# Patient Record
Sex: MALE | Race: WHITE | Employment: FULL TIME
[De-identification: names, ages, dates, MRNs, and addresses within clinical notes are randomized per-mention and may not be internally consistent; named-entity substitution may affect disease eponyms.]

---

## 2017-01-06 ENCOUNTER — OFFICE VISIT (OUTPATIENT)
Dept: UROLOGY | Facility: CLINIC | Age: 32
End: 2017-01-06

## 2017-01-06 VITALS
TEMPERATURE: 98.4 F | WEIGHT: 228.8 LBS | HEART RATE: 113 BPM | SYSTOLIC BLOOD PRESSURE: 125 MMHG | BODY MASS INDEX: 34.68 KG/M2 | HEIGHT: 68 IN | DIASTOLIC BLOOD PRESSURE: 81 MMHG

## 2017-01-06 DIAGNOSIS — N50.819 ORCHALGIA: Primary | ICD-10-CM

## 2017-01-06 PROCEDURE — 99204 OFFICE O/P NEW MOD 45 MIN: CPT | Performed by: UROLOGY

## 2017-01-06 PROCEDURE — 51798 US URINE CAPACITY MEASURE: CPT | Performed by: UROLOGY

## 2017-01-06 RX ORDER — DOXYCYCLINE 100 MG/1
100 CAPSULE ORAL 2 TIMES DAILY
Qty: 14 CAPSULE | Refills: 0 | Status: SHIPPED | OUTPATIENT
Start: 2017-01-06 | End: 2017-05-19

## 2017-01-06 ASSESSMENT — ENCOUNTER SYMPTOMS
EYE REDNESS: 0
SHORTNESS OF BREATH: 0
VOMITING: 0
WHEEZING: 0
NAUSEA: 0
BACK PAIN: 0
COUGH: 1
EYE PAIN: 0
ABDOMINAL PAIN: 0
COLOR CHANGE: 0

## 2017-05-19 ENCOUNTER — OFFICE VISIT (OUTPATIENT)
Dept: FAMILY MEDICINE CLINIC | Age: 32
End: 2017-05-19
Payer: COMMERCIAL

## 2017-05-19 VITALS
WEIGHT: 229 LBS | RESPIRATION RATE: 17 BRPM | HEART RATE: 80 BPM | DIASTOLIC BLOOD PRESSURE: 86 MMHG | SYSTOLIC BLOOD PRESSURE: 120 MMHG | BODY MASS INDEX: 34.82 KG/M2

## 2017-05-19 DIAGNOSIS — J30.2 SEASONAL ALLERGIC RHINITIS, UNSPECIFIED ALLERGIC RHINITIS TRIGGER: ICD-10-CM

## 2017-05-19 DIAGNOSIS — M10.00 IDIOPATHIC GOUT, UNSPECIFIED CHRONICITY, UNSPECIFIED SITE: Primary | ICD-10-CM

## 2017-05-19 DIAGNOSIS — E66.9 OBESITY (BMI 30.0-34.9): ICD-10-CM

## 2017-05-19 DIAGNOSIS — E78.1 HYPERTRIGLYCERIDEMIA: ICD-10-CM

## 2017-05-19 DIAGNOSIS — R73.01 IMPAIRED FASTING GLUCOSE: ICD-10-CM

## 2017-05-19 PROCEDURE — 1036F TOBACCO NON-USER: CPT | Performed by: FAMILY MEDICINE

## 2017-05-19 PROCEDURE — G8417 CALC BMI ABV UP PARAM F/U: HCPCS | Performed by: FAMILY MEDICINE

## 2017-05-19 PROCEDURE — 99214 OFFICE O/P EST MOD 30 MIN: CPT | Performed by: FAMILY MEDICINE

## 2017-05-19 PROCEDURE — G8427 DOCREV CUR MEDS BY ELIG CLIN: HCPCS | Performed by: FAMILY MEDICINE

## 2017-05-19 RX ORDER — ALBUTEROL SULFATE 90 UG/1
2 AEROSOL, METERED RESPIRATORY (INHALATION) EVERY 6 HOURS PRN
Qty: 1 INHALER | Refills: 0 | Status: SHIPPED | OUTPATIENT
Start: 2017-05-19 | End: 2019-03-14 | Stop reason: SDUPTHER

## 2017-05-19 RX ORDER — METHYLPREDNISOLONE 4 MG/1
TABLET ORAL
Qty: 1 KIT | Refills: 0 | Status: SHIPPED | OUTPATIENT
Start: 2017-05-19 | End: 2018-11-23 | Stop reason: SDUPTHER

## 2017-05-19 RX ORDER — FLUTICASONE PROPIONATE 50 MCG
SPRAY, SUSPENSION (ML) NASAL
Qty: 1 BOTTLE | Refills: 3 | Status: SHIPPED | OUTPATIENT
Start: 2017-05-19 | End: 2020-09-30 | Stop reason: ALTCHOICE

## 2017-05-19 RX ORDER — LORATADINE 10 MG/1
10 TABLET ORAL DAILY
Qty: 30 TABLET | Refills: 3 | Status: SHIPPED | OUTPATIENT
Start: 2017-05-19 | End: 2018-11-23 | Stop reason: SDUPTHER

## 2017-05-19 ASSESSMENT — PATIENT HEALTH QUESTIONNAIRE - PHQ9
SUM OF ALL RESPONSES TO PHQ QUESTIONS 1-9: 0
SUM OF ALL RESPONSES TO PHQ9 QUESTIONS 1 & 2: 0
2. FEELING DOWN, DEPRESSED OR HOPELESS: 0
1. LITTLE INTEREST OR PLEASURE IN DOING THINGS: 0

## 2017-05-19 ASSESSMENT — ENCOUNTER SYMPTOMS
BLOOD IN STOOL: 0
RHINORRHEA: 1
SHORTNESS OF BREATH: 1
WHEEZING: 1
ABDOMINAL PAIN: 0
EYE ITCHING: 1

## 2018-11-16 ENCOUNTER — TELEPHONE (OUTPATIENT)
Dept: FAMILY MEDICINE CLINIC | Age: 33
End: 2018-11-16

## 2018-11-16 RX ORDER — INDOMETHACIN 25 MG/1
CAPSULE ORAL
Qty: 30 CAPSULE | Refills: 0 | Status: SHIPPED | OUTPATIENT
Start: 2018-11-16 | End: 2019-03-14 | Stop reason: SDUPTHER

## 2018-11-23 ENCOUNTER — HOSPITAL ENCOUNTER (OUTPATIENT)
Age: 33
Discharge: HOME | End: 2018-11-25
Payer: COMMERCIAL

## 2018-11-23 ENCOUNTER — OFFICE VISIT (OUTPATIENT)
Dept: FAMILY MEDICINE CLINIC | Age: 33
End: 2018-11-23
Payer: COMMERCIAL

## 2018-11-23 ENCOUNTER — HOSPITAL ENCOUNTER (OUTPATIENT)
Age: 33
Discharge: HOME | End: 2018-11-23
Payer: COMMERCIAL

## 2018-11-23 ENCOUNTER — HOSPITAL ENCOUNTER (OUTPATIENT)
Dept: GENERAL RADIOLOGY | Age: 33
Discharge: HOME | End: 2018-11-25
Payer: COMMERCIAL

## 2018-11-23 VITALS
WEIGHT: 225.4 LBS | DIASTOLIC BLOOD PRESSURE: 80 MMHG | BODY MASS INDEX: 34.27 KG/M2 | HEART RATE: 60 BPM | RESPIRATION RATE: 16 BRPM | SYSTOLIC BLOOD PRESSURE: 120 MMHG

## 2018-11-23 DIAGNOSIS — M10.00 IDIOPATHIC GOUT, UNSPECIFIED CHRONICITY, UNSPECIFIED SITE: ICD-10-CM

## 2018-11-23 DIAGNOSIS — R04.2 HEMOPTYSIS: ICD-10-CM

## 2018-11-23 DIAGNOSIS — E78.1 HYPERTRIGLYCERIDEMIA: ICD-10-CM

## 2018-11-23 DIAGNOSIS — R04.2 HEMOPTYSIS: Primary | ICD-10-CM

## 2018-11-23 LAB
ABSOLUTE EOS #: 0.1 K/UL (ref 0–0.4)
ABSOLUTE IMMATURE GRANULOCYTE: ABNORMAL K/UL (ref 0–0.3)
ABSOLUTE LYMPH #: 1.9 K/UL (ref 1–4.8)
ABSOLUTE MONO #: 0.5 K/UL (ref 0.1–1.3)
ALBUMIN SERPL-MCNC: 4.4 G/DL (ref 3.5–5.2)
ALBUMIN/GLOBULIN RATIO: ABNORMAL (ref 1–2.5)
ALP BLD-CCNC: 42 U/L (ref 40–129)
ALT SERPL-CCNC: 34 U/L (ref 5–41)
ANION GAP SERPL CALCULATED.3IONS-SCNC: 10 MMOL/L (ref 9–17)
AST SERPL-CCNC: 24 U/L
BASOPHILS # BLD: 1 % (ref 0–2)
BASOPHILS ABSOLUTE: 0.1 K/UL (ref 0–0.2)
BILIRUB SERPL-MCNC: 0.36 MG/DL (ref 0.3–1.2)
BUN BLDV-MCNC: 14 MG/DL (ref 6–20)
BUN/CREAT BLD: ABNORMAL (ref 9–20)
CALCIUM SERPL-MCNC: 9.6 MG/DL (ref 8.6–10.4)
CHLORIDE BLD-SCNC: 102 MMOL/L (ref 98–107)
CHOLESTEROL/HDL RATIO: 3.4
CHOLESTEROL: 153 MG/DL
CO2: 28 MMOL/L (ref 20–31)
CREAT SERPL-MCNC: 0.84 MG/DL (ref 0.7–1.2)
DIFFERENTIAL TYPE: ABNORMAL
EOSINOPHILS RELATIVE PERCENT: 1 % (ref 0–4)
GFR AFRICAN AMERICAN: >60 ML/MIN
GFR NON-AFRICAN AMERICAN: >60 ML/MIN
GFR SERPL CREATININE-BSD FRML MDRD: ABNORMAL ML/MIN/{1.73_M2}
GFR SERPL CREATININE-BSD FRML MDRD: ABNORMAL ML/MIN/{1.73_M2}
GLUCOSE BLD-MCNC: 105 MG/DL (ref 70–99)
HCT VFR BLD CALC: 46.2 % (ref 41–53)
HDLC SERPL-MCNC: 45 MG/DL
HEMOGLOBIN: 15.6 G/DL (ref 13.5–17.5)
IMMATURE GRANULOCYTES: ABNORMAL %
LDL CHOLESTEROL: 79 MG/DL (ref 0–130)
LYMPHOCYTES # BLD: 31 % (ref 24–44)
MCH RBC QN AUTO: 30.8 PG (ref 26–34)
MCHC RBC AUTO-ENTMCNC: 33.7 G/DL (ref 31–37)
MCV RBC AUTO: 91.4 FL (ref 80–100)
MONOCYTES # BLD: 8 % (ref 1–7)
NRBC AUTOMATED: ABNORMAL PER 100 WBC
PDW BLD-RTO: 12.6 % (ref 11.5–14.9)
PLATELET # BLD: 236 K/UL (ref 150–450)
PLATELET ESTIMATE: ABNORMAL
PMV BLD AUTO: 7.8 FL (ref 6–12)
POTASSIUM SERPL-SCNC: 4.6 MMOL/L (ref 3.7–5.3)
RBC # BLD: 5.06 M/UL (ref 4.5–5.9)
RBC # BLD: ABNORMAL 10*6/UL
SEG NEUTROPHILS: 59 % (ref 36–66)
SEGMENTED NEUTROPHILS ABSOLUTE COUNT: 3.7 K/UL (ref 1.3–9.1)
SODIUM BLD-SCNC: 140 MMOL/L (ref 135–144)
TOTAL PROTEIN: 7.4 G/DL (ref 6.4–8.3)
TRIGL SERPL-MCNC: 146 MG/DL
URIC ACID: 7.1 MG/DL (ref 3.4–7)
VLDLC SERPL CALC-MCNC: NORMAL MG/DL (ref 1–30)
WBC # BLD: 6.3 K/UL (ref 3.5–11)
WBC # BLD: ABNORMAL 10*3/UL

## 2018-11-23 PROCEDURE — 80053 COMPREHEN METABOLIC PANEL: CPT

## 2018-11-23 PROCEDURE — G8417 CALC BMI ABV UP PARAM F/U: HCPCS | Performed by: NURSE PRACTITIONER

## 2018-11-23 PROCEDURE — 71046 X-RAY EXAM CHEST 2 VIEWS: CPT

## 2018-11-23 PROCEDURE — 36415 COLL VENOUS BLD VENIPUNCTURE: CPT

## 2018-11-23 PROCEDURE — 84550 ASSAY OF BLOOD/URIC ACID: CPT

## 2018-11-23 PROCEDURE — 85025 COMPLETE CBC W/AUTO DIFF WBC: CPT

## 2018-11-23 PROCEDURE — G8427 DOCREV CUR MEDS BY ELIG CLIN: HCPCS | Performed by: NURSE PRACTITIONER

## 2018-11-23 PROCEDURE — 80061 LIPID PANEL: CPT

## 2018-11-23 PROCEDURE — 99215 OFFICE O/P EST HI 40 MIN: CPT | Performed by: NURSE PRACTITIONER

## 2018-11-23 PROCEDURE — 1036F TOBACCO NON-USER: CPT | Performed by: NURSE PRACTITIONER

## 2018-11-23 PROCEDURE — G8484 FLU IMMUNIZE NO ADMIN: HCPCS | Performed by: NURSE PRACTITIONER

## 2018-11-23 RX ORDER — LORATADINE 10 MG/1
10 TABLET ORAL DAILY
Qty: 30 TABLET | Refills: 3 | Status: SHIPPED | OUTPATIENT
Start: 2018-11-23

## 2018-11-23 RX ORDER — COLCHICINE 0.6 MG/1
0.6 TABLET ORAL DAILY
Qty: 30 TABLET | Refills: 0 | Status: SHIPPED | OUTPATIENT
Start: 2018-11-23 | End: 2019-03-28

## 2018-11-23 RX ORDER — METHYLPREDNISOLONE 4 MG/1
TABLET ORAL
Qty: 1 KIT | Refills: 0 | Status: SHIPPED | OUTPATIENT
Start: 2018-11-23 | End: 2019-03-14 | Stop reason: SDUPTHER

## 2018-11-23 RX ORDER — ALLOPURINOL 100 MG/1
100 TABLET ORAL DAILY
Qty: 30 TABLET | Refills: 5 | Status: SHIPPED | OUTPATIENT
Start: 2018-11-23 | End: 2019-03-14 | Stop reason: SDUPTHER

## 2018-11-23 RX ORDER — AZITHROMYCIN 250 MG/1
TABLET, FILM COATED ORAL
Qty: 1 PACKET | Refills: 0 | Status: SHIPPED | OUTPATIENT
Start: 2018-11-23 | End: 2018-12-03

## 2018-11-23 ASSESSMENT — PATIENT HEALTH QUESTIONNAIRE - PHQ9
SUM OF ALL RESPONSES TO PHQ QUESTIONS 1-9: 0
SUM OF ALL RESPONSES TO PHQ QUESTIONS 1-9: 0
1. LITTLE INTEREST OR PLEASURE IN DOING THINGS: 0
SUM OF ALL RESPONSES TO PHQ9 QUESTIONS 1 & 2: 0
2. FEELING DOWN, DEPRESSED OR HOPELESS: 0

## 2018-11-23 NOTE — PROGRESS NOTES
Subjective:      Patient ID: Evon Farmer is a 35 y.o. male. Visit Information    Have you changed or started any medications since your last visit including any over-the-counter medicines, vitamins, or herbal medicines? no   Are you having any side effects from any of your medications? -  no  Have you stopped taking any of your medications? Is so, why? -  no    Have you seen any other physician or provider since your last visit? No  Have you had any other diagnostic tests since your last visit? No  Have you been seen in the emergency room and/or had an admission to a hospital since we last saw you? No  Have you had your routine dental cleaning in the past 6 months? no    Have you activated your [employer] account? If not, what are your barriers? No     Patient Care Team:  Cirilo Diop MD as PCP - General (Family Medicine)    Medical History Review  Past Medical, Family, and Social History reviewed and does not contribute to the patient presenting condition    Health Maintenance   Topic Date Due    A1C test (Diabetic or Prediabetic)  02/25/1995    HIV screen  02/25/2000    DTaP/Tdap/Td vaccine (1 - Tdap) 02/25/2004    Flu vaccine (1) 09/01/2018     HPI  35year old male presents with hemoptysis nasal congestion/pressure post nasal drainage cough for 1 month. cough is productive. he denies fever chills body ache malaise or nv abd pain,  Pt is a non smoker. Also c/o left great toe pain swelling and takes indocin without relief. Used to take allopurinol but has been off for a while. Hx of gout  Also noted to have elevated triglycerides and has been on diet measures. Review of Systems   Constitutional: Negative for chills and fever. HENT: Positive for congestion, postnasal drip and rhinorrhea. Negative for sinus pressure. Eyes: Negative for visual disturbance. Respiratory: Positive for cough. Negative for shortness of breath.          Cough out blood tinged sputum   Cardiovascular: Negative for

## 2018-11-25 ASSESSMENT — ENCOUNTER SYMPTOMS
SHORTNESS OF BREATH: 0
SINUS PRESSURE: 0
VOMITING: 0
RHINORRHEA: 1
ABDOMINAL PAIN: 0
NAUSEA: 0
COUGH: 1

## 2019-03-14 ENCOUNTER — OFFICE VISIT (OUTPATIENT)
Dept: FAMILY MEDICINE CLINIC | Age: 34
End: 2019-03-14
Payer: COMMERCIAL

## 2019-03-14 VITALS
SYSTOLIC BLOOD PRESSURE: 120 MMHG | RESPIRATION RATE: 16 BRPM | WEIGHT: 231 LBS | TEMPERATURE: 97.5 F | BODY MASS INDEX: 35.01 KG/M2 | HEART RATE: 84 BPM | HEIGHT: 68 IN | DIASTOLIC BLOOD PRESSURE: 80 MMHG

## 2019-03-14 DIAGNOSIS — M10.00 IDIOPATHIC GOUT, UNSPECIFIED CHRONICITY, UNSPECIFIED SITE: Primary | ICD-10-CM

## 2019-03-14 DIAGNOSIS — J20.9 ACUTE BRONCHITIS, UNSPECIFIED ORGANISM: ICD-10-CM

## 2019-03-14 PROCEDURE — G8417 CALC BMI ABV UP PARAM F/U: HCPCS | Performed by: NURSE PRACTITIONER

## 2019-03-14 PROCEDURE — G8427 DOCREV CUR MEDS BY ELIG CLIN: HCPCS | Performed by: NURSE PRACTITIONER

## 2019-03-14 PROCEDURE — G8484 FLU IMMUNIZE NO ADMIN: HCPCS | Performed by: NURSE PRACTITIONER

## 2019-03-14 PROCEDURE — 99214 OFFICE O/P EST MOD 30 MIN: CPT | Performed by: NURSE PRACTITIONER

## 2019-03-14 PROCEDURE — 1036F TOBACCO NON-USER: CPT | Performed by: NURSE PRACTITIONER

## 2019-03-14 RX ORDER — INDOMETHACIN 25 MG/1
CAPSULE ORAL
Qty: 30 CAPSULE | Refills: 2 | Status: SHIPPED | OUTPATIENT
Start: 2019-03-14 | End: 2019-03-28

## 2019-03-14 RX ORDER — ALBUTEROL SULFATE 90 UG/1
2 AEROSOL, METERED RESPIRATORY (INHALATION) EVERY 6 HOURS PRN
Qty: 1 INHALER | Refills: 1 | Status: SHIPPED | OUTPATIENT
Start: 2019-03-14 | End: 2020-06-04

## 2019-03-14 RX ORDER — ALLOPURINOL 300 MG/1
300 TABLET ORAL DAILY
Qty: 30 TABLET | Refills: 5 | Status: SHIPPED | OUTPATIENT
Start: 2019-03-14 | End: 2020-06-04

## 2019-03-14 RX ORDER — METHYLPREDNISOLONE 4 MG/1
TABLET ORAL
Qty: 1 KIT | Refills: 0 | Status: SHIPPED | OUTPATIENT
Start: 2019-03-14 | End: 2019-09-13

## 2019-03-14 RX ORDER — BENZONATATE 200 MG/1
200 CAPSULE ORAL 3 TIMES DAILY PRN
Qty: 30 CAPSULE | Refills: 0 | Status: SHIPPED | OUTPATIENT
Start: 2019-03-14 | End: 2019-03-21

## 2019-03-14 ASSESSMENT — ENCOUNTER SYMPTOMS
SHORTNESS OF BREATH: 0
VOMITING: 0
SINUS PRESSURE: 1
NAUSEA: 0
RHINORRHEA: 1
COUGH: 1
ABDOMINAL PAIN: 0

## 2019-03-14 ASSESSMENT — PATIENT HEALTH QUESTIONNAIRE - PHQ9
SUM OF ALL RESPONSES TO PHQ QUESTIONS 1-9: 0
SUM OF ALL RESPONSES TO PHQ QUESTIONS 1-9: 0
2. FEELING DOWN, DEPRESSED OR HOPELESS: 0

## 2019-03-28 ENCOUNTER — TELEPHONE (OUTPATIENT)
Dept: FAMILY MEDICINE CLINIC | Age: 34
End: 2019-03-28

## 2019-03-28 RX ORDER — COLCHICINE 0.6 MG/1
1 CAPSULE ORAL DAILY
Qty: 10 CAPSULE | Refills: 0 | Status: SHIPPED | OUTPATIENT
Start: 2019-03-28 | End: 2019-04-26 | Stop reason: SDUPTHER

## 2019-03-28 RX ORDER — INDOMETHACIN 50 MG/1
50 CAPSULE ORAL 3 TIMES DAILY PRN
Qty: 30 CAPSULE | Refills: 0 | Status: SHIPPED | OUTPATIENT
Start: 2019-03-28 | End: 2019-04-26 | Stop reason: SDUPTHER

## 2019-04-26 ENCOUNTER — OFFICE VISIT (OUTPATIENT)
Dept: FAMILY MEDICINE CLINIC | Age: 34
End: 2019-04-26
Payer: COMMERCIAL

## 2019-04-26 VITALS
DIASTOLIC BLOOD PRESSURE: 76 MMHG | HEIGHT: 68 IN | HEART RATE: 84 BPM | WEIGHT: 233.2 LBS | RESPIRATION RATE: 16 BRPM | BODY MASS INDEX: 35.34 KG/M2 | SYSTOLIC BLOOD PRESSURE: 126 MMHG

## 2019-04-26 DIAGNOSIS — E79.0 ELEVATED URIC ACID IN BLOOD: ICD-10-CM

## 2019-04-26 DIAGNOSIS — M10.00 IDIOPATHIC GOUT, UNSPECIFIED CHRONICITY, UNSPECIFIED SITE: Primary | ICD-10-CM

## 2019-04-26 PROCEDURE — 1036F TOBACCO NON-USER: CPT | Performed by: NURSE PRACTITIONER

## 2019-04-26 PROCEDURE — G8417 CALC BMI ABV UP PARAM F/U: HCPCS | Performed by: NURSE PRACTITIONER

## 2019-04-26 PROCEDURE — 99214 OFFICE O/P EST MOD 30 MIN: CPT | Performed by: NURSE PRACTITIONER

## 2019-04-26 PROCEDURE — G8427 DOCREV CUR MEDS BY ELIG CLIN: HCPCS | Performed by: NURSE PRACTITIONER

## 2019-04-26 RX ORDER — INDOMETHACIN 50 MG/1
50 CAPSULE ORAL 3 TIMES DAILY PRN
Qty: 30 CAPSULE | Refills: 0 | Status: SHIPPED | OUTPATIENT
Start: 2019-04-26 | End: 2019-05-05 | Stop reason: SDUPTHER

## 2019-04-26 RX ORDER — COLCHICINE 0.6 MG/1
1 CAPSULE ORAL DAILY
Qty: 30 CAPSULE | Refills: 0 | Status: ON HOLD | OUTPATIENT
Start: 2019-04-26 | End: 2020-10-12

## 2019-04-26 ASSESSMENT — ENCOUNTER SYMPTOMS
COUGH: 0
SHORTNESS OF BREATH: 0
NAUSEA: 0
ABDOMINAL PAIN: 0

## 2019-04-26 ASSESSMENT — PATIENT HEALTH QUESTIONNAIRE - PHQ9
1. LITTLE INTEREST OR PLEASURE IN DOING THINGS: 0
SUM OF ALL RESPONSES TO PHQ QUESTIONS 1-9: 0
2. FEELING DOWN, DEPRESSED OR HOPELESS: 0
SUM OF ALL RESPONSES TO PHQ9 QUESTIONS 1 & 2: 0
SUM OF ALL RESPONSES TO PHQ QUESTIONS 1-9: 0

## 2019-04-26 NOTE — PATIENT INSTRUCTIONS
Patient Education        Purine-Restricted Diet: Care Instructions  Your Care Instructions    Purines are substances that are found in some foods. Your body turns purines into uric acid. High levels of uric acid can cause gout, which is a form of arthritis that causes pain and inflammation in joints. You may be able to help control the amount of uric acid in your body by limiting high-purine foods in your diet. Follow-up care is a key part of your treatment and safety. Be sure to make and go to all appointments, and call your doctor if you are having problems. It's also a good idea to know your test results and keep a list of the medicines you take. How can you care for yourself at home? · Plan your meals and snacks around foods that are low in purines and are safe for you to eat. These foods include:  ? Green vegetables and tomatoes. ? Fruits. ? Whole-grain breads, rice, and cereals. ? Eggs, peanut butter, and nuts. ? Low-fat milk, cheese, and other milk products. ? Popcorn. ? Gelatin desserts, chocolate, cocoa, and cakes and sweets, in small amounts. · You can eat certain foods that are medium-high in purines, but eat them only once in a while. These foods include:  ? Legumes, such as dried beans and dried peas. You can have 1 cup cooked legumes each day. ? Asparagus, cauliflower, spinach, mushrooms, and green peas. ? Fish and seafood (other than very high-purine seafood). ? Oatmeal, wheat bran, and wheat germ. · Limit very high-purine foods, including:  ? Organ meats, such as liver, kidneys, sweetbreads, and brains. ? Meats, including ma, beef, pork, and lamb. ? Game meats and any other meats in large amounts. ? Anchovies, sardines, herring, mackerel, and scallops. ? Gravy. ? Beer. Where can you learn more? Go to [email or web address] org and sign in to your [employer] account.  Enter F448 in the [facility] box to learn more about \"Purine-Restricted Diet: Care

## 2019-04-26 NOTE — PROGRESS NOTES
Gastrointestinal: Negative for abdominal pain and nausea. Musculoskeletal: Positive for arthralgias and joint swelling. Neurological: Negative for dizziness, weakness and numbness. Objective:   Physical Exam   Constitutional: He appears well-nourished. No distress. HENT:   Nose: Nose normal.   Mouth/Throat: Oropharynx is clear and moist.   Eyes: Conjunctivae are normal.   Neck: Neck supple. Cardiovascular: Normal rate, regular rhythm and normal heart sounds. Pulmonary/Chest: Effort normal and breath sounds normal. No respiratory distress. Abdominal: Soft. There is no tenderness. Musculoskeletal: He exhibits tenderness. He exhibits no edema. Feet:    Lymphadenopathy:     He has no cervical adenopathy. Neurological: He is alert. No cranial nerve deficit. Skin: Skin is warm and dry. Psychiatric: He has a normal mood and affect. His behavior is normal.   Nursing note and vitals reviewed. Assessment:       1. Idiopathic gout, unspecified chronicity, unspecified site    2. Elevated uric acid in blood            Plan:      BP Readings from Last 3 Encounters:   04/26/19 126/76   03/14/19 120/80   11/23/18 120/80     /76 (Site: Left Upper Arm, Position: Sitting, Cuff Size: Large Adult)   Pulse 84   Resp 16   Ht 5' 7.99\" (1.727 m)   Wt 233 lb 3.2 oz (105.8 kg)   BMI 35.47 kg/m²   Lab Results   Component Value Date    WBC 6.3 11/23/2018    HGB 15.6 11/23/2018    HCT 46.2 11/23/2018     11/23/2018    CHOL 153 11/23/2018    TRIG 146 11/23/2018    HDL 45 11/23/2018    ALT 45 (H) 04/27/2019    AST 24 04/27/2019     11/23/2018    K 4.6 11/23/2018     11/23/2018    CREATININE 0.96 04/27/2019    BUN 15 04/27/2019    CO2 28 11/23/2018    PSA 0.51 11/13/2014     Lab Results   Component Value Date    CALCIUM 9.6 11/23/2018     Lab Results   Component Value Date    LDLCHOLESTEROL 79 11/23/2018         1.  Idiopathic gout, unspecified chronicity, unspecified site  -

## 2019-04-27 ENCOUNTER — HOSPITAL ENCOUNTER (OUTPATIENT)
Age: 34
Discharge: HOME | End: 2019-04-27
Payer: COMMERCIAL

## 2019-04-27 DIAGNOSIS — E79.0 ELEVATED URIC ACID IN BLOOD: ICD-10-CM

## 2019-04-27 DIAGNOSIS — M10.00 IDIOPATHIC GOUT, UNSPECIFIED CHRONICITY, UNSPECIFIED SITE: ICD-10-CM

## 2019-04-27 LAB
ALT SERPL-CCNC: 45 U/L (ref 5–41)
AST SERPL-CCNC: 24 U/L
BUN BLDV-MCNC: 15 MG/DL (ref 6–20)
CREAT SERPL-MCNC: 0.96 MG/DL (ref 0.7–1.2)
GFR AFRICAN AMERICAN: >60 ML/MIN
GFR NON-AFRICAN AMERICAN: >60 ML/MIN
GFR SERPL CREATININE-BSD FRML MDRD: NORMAL ML/MIN/{1.73_M2}
GFR SERPL CREATININE-BSD FRML MDRD: NORMAL ML/MIN/{1.73_M2}
URIC ACID: 5.9 MG/DL (ref 3.4–7)

## 2019-04-27 PROCEDURE — 84450 TRANSFERASE (AST) (SGOT): CPT

## 2019-04-27 PROCEDURE — 82565 ASSAY OF CREATININE: CPT

## 2019-04-27 PROCEDURE — 84520 ASSAY OF UREA NITROGEN: CPT

## 2019-04-27 PROCEDURE — 84550 ASSAY OF BLOOD/URIC ACID: CPT

## 2019-04-27 PROCEDURE — 36415 COLL VENOUS BLD VENIPUNCTURE: CPT

## 2019-04-27 PROCEDURE — 84460 ALANINE AMINO (ALT) (SGPT): CPT

## 2019-05-05 DIAGNOSIS — M10.00 IDIOPATHIC GOUT, UNSPECIFIED CHRONICITY, UNSPECIFIED SITE: ICD-10-CM

## 2019-05-06 RX ORDER — INDOMETHACIN 50 MG/1
CAPSULE ORAL
Qty: 30 CAPSULE | Refills: 0 | Status: SHIPPED | OUTPATIENT
Start: 2019-05-06 | End: 2020-06-04

## 2019-09-13 ENCOUNTER — HOSPITAL ENCOUNTER (OUTPATIENT)
Age: 34
Discharge: HOME | End: 2019-09-13
Payer: COMMERCIAL

## 2019-09-13 ENCOUNTER — HOSPITAL ENCOUNTER (OUTPATIENT)
Dept: CT IMAGING | Age: 34
Discharge: HOME | End: 2019-09-15
Payer: COMMERCIAL

## 2019-09-13 ENCOUNTER — OFFICE VISIT (OUTPATIENT)
Dept: FAMILY MEDICINE CLINIC | Age: 34
End: 2019-09-13
Payer: COMMERCIAL

## 2019-09-13 VITALS
HEART RATE: 86 BPM | RESPIRATION RATE: 18 BRPM | TEMPERATURE: 97.6 F | SYSTOLIC BLOOD PRESSURE: 122 MMHG | WEIGHT: 226 LBS | DIASTOLIC BLOOD PRESSURE: 78 MMHG | BODY MASS INDEX: 34.37 KG/M2

## 2019-09-13 DIAGNOSIS — R10.12 LUQ ABDOMINAL PAIN: ICD-10-CM

## 2019-09-13 DIAGNOSIS — R11.0 NAUSEA: ICD-10-CM

## 2019-09-13 DIAGNOSIS — R10.12 LUQ ABDOMINAL PAIN: Primary | ICD-10-CM

## 2019-09-13 LAB
ABSOLUTE EOS #: 0.2 K/UL (ref 0–0.4)
ABSOLUTE IMMATURE GRANULOCYTE: ABNORMAL K/UL (ref 0–0.3)
ABSOLUTE LYMPH #: 2.6 K/UL (ref 1–4.8)
ABSOLUTE MONO #: 0.8 K/UL (ref 0.1–1.3)
ALBUMIN SERPL-MCNC: 4.7 G/DL (ref 3.5–5.2)
ALBUMIN/GLOBULIN RATIO: ABNORMAL (ref 1–2.5)
ALP BLD-CCNC: 47 U/L (ref 40–129)
ALT SERPL-CCNC: 41 U/L (ref 5–41)
ANION GAP SERPL CALCULATED.3IONS-SCNC: 13 MMOL/L (ref 9–17)
AST SERPL-CCNC: 22 U/L
BASOPHILS # BLD: 1 % (ref 0–2)
BASOPHILS ABSOLUTE: 0.1 K/UL (ref 0–0.2)
BILIRUB SERPL-MCNC: 0.18 MG/DL (ref 0.3–1.2)
BUN BLDV-MCNC: 18 MG/DL (ref 6–20)
BUN/CREAT BLD: ABNORMAL (ref 9–20)
CALCIUM SERPL-MCNC: 9.6 MG/DL (ref 8.6–10.4)
CHLORIDE BLD-SCNC: 102 MMOL/L (ref 98–107)
CO2: 25 MMOL/L (ref 20–31)
CREAT SERPL-MCNC: 0.93 MG/DL (ref 0.7–1.2)
DIFFERENTIAL TYPE: ABNORMAL
EOSINOPHILS RELATIVE PERCENT: 2 % (ref 0–4)
GFR AFRICAN AMERICAN: >60 ML/MIN
GFR NON-AFRICAN AMERICAN: >60 ML/MIN
GFR SERPL CREATININE-BSD FRML MDRD: ABNORMAL ML/MIN/{1.73_M2}
GFR SERPL CREATININE-BSD FRML MDRD: ABNORMAL ML/MIN/{1.73_M2}
GLUCOSE BLD-MCNC: 101 MG/DL (ref 70–99)
HCT VFR BLD CALC: 45.2 % (ref 41–53)
HEMOGLOBIN: 15.6 G/DL (ref 13.5–17.5)
IMMATURE GRANULOCYTES: ABNORMAL %
LIPASE: 46 U/L (ref 13–60)
LYMPHOCYTES # BLD: 31 % (ref 24–44)
MCH RBC QN AUTO: 32.1 PG (ref 26–34)
MCHC RBC AUTO-ENTMCNC: 34.4 G/DL (ref 31–37)
MCV RBC AUTO: 93.1 FL (ref 80–100)
MONOCYTES # BLD: 9 % (ref 1–7)
NRBC AUTOMATED: ABNORMAL PER 100 WBC
PDW BLD-RTO: 12.6 % (ref 11.5–14.9)
PLATELET # BLD: 237 K/UL (ref 150–450)
PLATELET ESTIMATE: ABNORMAL
PMV BLD AUTO: 7.7 FL (ref 6–12)
POTASSIUM SERPL-SCNC: 4 MMOL/L (ref 3.7–5.3)
RBC # BLD: 4.86 M/UL (ref 4.5–5.9)
RBC # BLD: ABNORMAL 10*6/UL
SEG NEUTROPHILS: 57 % (ref 36–66)
SEGMENTED NEUTROPHILS ABSOLUTE COUNT: 4.8 K/UL (ref 1.3–9.1)
SODIUM BLD-SCNC: 140 MMOL/L (ref 135–144)
TOTAL PROTEIN: 7.4 G/DL (ref 6.4–8.3)
WBC # BLD: 8.6 K/UL (ref 3.5–11)
WBC # BLD: ABNORMAL 10*3/UL

## 2019-09-13 PROCEDURE — 83690 ASSAY OF LIPASE: CPT

## 2019-09-13 PROCEDURE — G8427 DOCREV CUR MEDS BY ELIG CLIN: HCPCS | Performed by: NURSE PRACTITIONER

## 2019-09-13 PROCEDURE — 36415 COLL VENOUS BLD VENIPUNCTURE: CPT

## 2019-09-13 PROCEDURE — 74177 CT ABD & PELVIS W/CONTRAST: CPT

## 2019-09-13 PROCEDURE — 85025 COMPLETE CBC W/AUTO DIFF WBC: CPT

## 2019-09-13 PROCEDURE — 80053 COMPREHEN METABOLIC PANEL: CPT

## 2019-09-13 PROCEDURE — G8417 CALC BMI ABV UP PARAM F/U: HCPCS | Performed by: NURSE PRACTITIONER

## 2019-09-13 PROCEDURE — 1036F TOBACCO NON-USER: CPT | Performed by: NURSE PRACTITIONER

## 2019-09-13 PROCEDURE — 99214 OFFICE O/P EST MOD 30 MIN: CPT | Performed by: NURSE PRACTITIONER

## 2019-09-13 PROCEDURE — 6360000004 HC RX CONTRAST MEDICATION: Performed by: NURSE PRACTITIONER

## 2019-09-13 PROCEDURE — 2580000003 HC RX 258: Performed by: NURSE PRACTITIONER

## 2019-09-13 RX ORDER — SODIUM CHLORIDE 0.9 % (FLUSH) 0.9 %
10 SYRINGE (ML) INJECTION PRN
Status: DISCONTINUED | OUTPATIENT
Start: 2019-09-13 | End: 2019-09-16 | Stop reason: HOSPADM

## 2019-09-13 RX ORDER — 0.9 % SODIUM CHLORIDE 0.9 %
80 INTRAVENOUS SOLUTION INTRAVENOUS ONCE
Status: COMPLETED | OUTPATIENT
Start: 2019-09-13 | End: 2019-09-13

## 2019-09-13 RX ADMIN — SODIUM CHLORIDE 80 ML: 9 INJECTION, SOLUTION INTRAVENOUS at 18:56

## 2019-09-13 RX ADMIN — Medication 10 ML: at 18:56

## 2019-09-13 RX ADMIN — IOVERSOL 75 ML: 741 INJECTION INTRA-ARTERIAL; INTRAVENOUS at 18:56

## 2019-09-13 ASSESSMENT — ENCOUNTER SYMPTOMS
ABDOMINAL PAIN: 1
COUGH: 0
NAUSEA: 1
SHORTNESS OF BREATH: 0

## 2020-02-21 PROBLEM — T30.0 BURN: Status: ACTIVE | Noted: 2020-02-21

## 2020-06-03 ENCOUNTER — TELEPHONE (OUTPATIENT)
Dept: FAMILY MEDICINE CLINIC | Age: 35
End: 2020-06-03

## 2020-06-04 ENCOUNTER — HOSPITAL ENCOUNTER (INPATIENT)
Age: 35
LOS: 3 days | Discharge: HOME | DRG: 603 | End: 2020-06-07
Attending: EMERGENCY MEDICINE | Admitting: FAMILY MEDICINE
Payer: COMMERCIAL

## 2020-06-04 ENCOUNTER — APPOINTMENT (OUTPATIENT)
Dept: CT IMAGING | Age: 35
DRG: 603 | End: 2020-06-04
Payer: COMMERCIAL

## 2020-06-04 PROBLEM — H05.011: Status: ACTIVE | Noted: 2020-06-04

## 2020-06-04 PROBLEM — L03.213 PRESEPTAL CELLULITIS OF RIGHT EYE: Status: ACTIVE | Noted: 2020-06-04

## 2020-06-04 PROBLEM — L03.211 FACIAL CELLULITIS: Status: ACTIVE | Noted: 2020-06-04

## 2020-06-04 LAB
ABSOLUTE EOS #: 0.1 K/UL (ref 0–0.4)
ABSOLUTE IMMATURE GRANULOCYTE: ABNORMAL K/UL (ref 0–0.3)
ABSOLUTE LYMPH #: 1.6 K/UL (ref 1–4.8)
ABSOLUTE MONO #: 1 K/UL (ref 0.1–1.3)
ALBUMIN SERPL-MCNC: 4.6 G/DL (ref 3.5–5.2)
ALBUMIN/GLOBULIN RATIO: ABNORMAL (ref 1–2.5)
ALP BLD-CCNC: 50 U/L (ref 40–129)
ALT SERPL-CCNC: 19 U/L (ref 5–41)
ANION GAP SERPL CALCULATED.3IONS-SCNC: 14 MMOL/L (ref 9–17)
AST SERPL-CCNC: 13 U/L
BASOPHILS # BLD: 1 % (ref 0–2)
BASOPHILS ABSOLUTE: 0.1 K/UL (ref 0–0.2)
BILIRUB SERPL-MCNC: 0.48 MG/DL (ref 0.3–1.2)
BUN BLDV-MCNC: 10 MG/DL (ref 6–20)
BUN/CREAT BLD: ABNORMAL (ref 9–20)
CALCIUM SERPL-MCNC: 9.5 MG/DL (ref 8.6–10.4)
CHLORIDE BLD-SCNC: 100 MMOL/L (ref 98–107)
CO2: 23 MMOL/L (ref 20–31)
CREAT SERPL-MCNC: 0.84 MG/DL (ref 0.7–1.2)
DIFFERENTIAL TYPE: ABNORMAL
EOSINOPHILS RELATIVE PERCENT: 1 % (ref 0–4)
GFR AFRICAN AMERICAN: >60 ML/MIN
GFR NON-AFRICAN AMERICAN: >60 ML/MIN
GFR SERPL CREATININE-BSD FRML MDRD: ABNORMAL ML/MIN/{1.73_M2}
GFR SERPL CREATININE-BSD FRML MDRD: ABNORMAL ML/MIN/{1.73_M2}
GLUCOSE BLD-MCNC: 118 MG/DL (ref 70–99)
HCT VFR BLD CALC: 44 % (ref 41–53)
HEMOGLOBIN: 14.6 G/DL (ref 13.5–17.5)
IMMATURE GRANULOCYTES: ABNORMAL %
LACTIC ACID: 1 MMOL/L (ref 0.5–2.2)
LYMPHOCYTES # BLD: 12 % (ref 24–44)
MCH RBC QN AUTO: 29.3 PG (ref 26–34)
MCHC RBC AUTO-ENTMCNC: 33.3 G/DL (ref 31–37)
MCV RBC AUTO: 87.9 FL (ref 80–100)
MONOCYTES # BLD: 8 % (ref 1–7)
NRBC AUTOMATED: ABNORMAL PER 100 WBC
PDW BLD-RTO: 16.5 % (ref 11.5–14.9)
PLATELET # BLD: 253 K/UL (ref 150–450)
PLATELET ESTIMATE: ABNORMAL
PMV BLD AUTO: 7.8 FL (ref 6–12)
POTASSIUM SERPL-SCNC: 3.8 MMOL/L (ref 3.7–5.3)
RBC # BLD: 5 M/UL (ref 4.5–5.9)
RBC # BLD: ABNORMAL 10*6/UL
SEG NEUTROPHILS: 78 % (ref 36–66)
SEGMENTED NEUTROPHILS ABSOLUTE COUNT: 10.6 K/UL (ref 1.3–9.1)
SODIUM BLD-SCNC: 137 MMOL/L (ref 135–144)
TOTAL PROTEIN: 7.9 G/DL (ref 6.4–8.3)
WBC # BLD: 13.4 K/UL (ref 3.5–11)
WBC # BLD: ABNORMAL 10*3/UL

## 2020-06-04 PROCEDURE — 6370000000 HC RX 637 (ALT 250 FOR IP): Performed by: FAMILY MEDICINE

## 2020-06-04 PROCEDURE — 87070 CULTURE OTHR SPECIMN AEROBIC: CPT

## 2020-06-04 PROCEDURE — 86403 PARTICLE AGGLUT ANTBDY SCRN: CPT

## 2020-06-04 PROCEDURE — 87040 BLOOD CULTURE FOR BACTERIA: CPT

## 2020-06-04 PROCEDURE — 1200000000 HC SEMI PRIVATE

## 2020-06-04 PROCEDURE — 6370000000 HC RX 637 (ALT 250 FOR IP): Performed by: EMERGENCY MEDICINE

## 2020-06-04 PROCEDURE — 2580000003 HC RX 258: Performed by: FAMILY MEDICINE

## 2020-06-04 PROCEDURE — 6360000002 HC RX W HCPCS: Performed by: EMERGENCY MEDICINE

## 2020-06-04 PROCEDURE — 2580000003 HC RX 258: Performed by: INTERNAL MEDICINE

## 2020-06-04 PROCEDURE — 87205 SMEAR GRAM STAIN: CPT

## 2020-06-04 PROCEDURE — 2580000003 HC RX 258: Performed by: EMERGENCY MEDICINE

## 2020-06-04 PROCEDURE — 96367 TX/PROPH/DG ADDL SEQ IV INF: CPT

## 2020-06-04 PROCEDURE — 96366 THER/PROPH/DIAG IV INF ADDON: CPT

## 2020-06-04 PROCEDURE — 85025 COMPLETE CBC W/AUTO DIFF WBC: CPT

## 2020-06-04 PROCEDURE — 6360000002 HC RX W HCPCS: Performed by: INTERNAL MEDICINE

## 2020-06-04 PROCEDURE — 6360000004 HC RX CONTRAST MEDICATION: Performed by: EMERGENCY MEDICINE

## 2020-06-04 PROCEDURE — 87075 CULTR BACTERIA EXCEPT BLOOD: CPT

## 2020-06-04 PROCEDURE — 36415 COLL VENOUS BLD VENIPUNCTURE: CPT

## 2020-06-04 PROCEDURE — 80053 COMPREHEN METABOLIC PANEL: CPT

## 2020-06-04 PROCEDURE — 70487 CT MAXILLOFACIAL W/DYE: CPT

## 2020-06-04 PROCEDURE — 99222 1ST HOSP IP/OBS MODERATE 55: CPT | Performed by: INTERNAL MEDICINE

## 2020-06-04 PROCEDURE — 87186 SC STD MICRODIL/AGAR DIL: CPT

## 2020-06-04 PROCEDURE — 99284 EMERGENCY DEPT VISIT MOD MDM: CPT

## 2020-06-04 PROCEDURE — 96365 THER/PROPH/DIAG IV INF INIT: CPT

## 2020-06-04 PROCEDURE — 83605 ASSAY OF LACTIC ACID: CPT

## 2020-06-04 PROCEDURE — 6370000000 HC RX 637 (ALT 250 FOR IP): Performed by: INTERNAL MEDICINE

## 2020-06-04 RX ORDER — 0.9 % SODIUM CHLORIDE 0.9 %
1000 INTRAVENOUS SOLUTION INTRAVENOUS ONCE
Status: COMPLETED | OUTPATIENT
Start: 2020-06-04 | End: 2020-06-04

## 2020-06-04 RX ORDER — 0.9 % SODIUM CHLORIDE 0.9 %
80 INTRAVENOUS SOLUTION INTRAVENOUS ONCE
Status: COMPLETED | OUTPATIENT
Start: 2020-06-04 | End: 2020-06-04

## 2020-06-04 RX ORDER — ACETAMINOPHEN 325 MG/1
650 TABLET ORAL EVERY 4 HOURS PRN
Status: DISCONTINUED | OUTPATIENT
Start: 2020-06-04 | End: 2020-06-07 | Stop reason: HOSPADM

## 2020-06-04 RX ORDER — SULFAMETHOXAZOLE AND TRIMETHOPRIM 800; 160 MG/1; MG/1
1 TABLET ORAL 2 TIMES DAILY
Status: ON HOLD | COMMUNITY
End: 2020-06-07 | Stop reason: HOSPADM

## 2020-06-04 RX ORDER — COLCHICINE 0.6 MG/1
0.6 TABLET ORAL DAILY
Status: DISCONTINUED | OUTPATIENT
Start: 2020-06-04 | End: 2020-06-07 | Stop reason: HOSPADM

## 2020-06-04 RX ORDER — ACETAMINOPHEN 325 MG/1
650 TABLET ORAL ONCE
Status: COMPLETED | OUTPATIENT
Start: 2020-06-04 | End: 2020-06-04

## 2020-06-04 RX ORDER — SODIUM CHLORIDE 0.9 % (FLUSH) 0.9 %
10 SYRINGE (ML) INJECTION EVERY 12 HOURS SCHEDULED
Status: DISCONTINUED | OUTPATIENT
Start: 2020-06-04 | End: 2020-06-07 | Stop reason: HOSPADM

## 2020-06-04 RX ORDER — SODIUM CHLORIDE 9 MG/ML
INJECTION, SOLUTION INTRAVENOUS CONTINUOUS
Status: DISCONTINUED | OUTPATIENT
Start: 2020-06-04 | End: 2020-06-07 | Stop reason: HOSPADM

## 2020-06-04 RX ORDER — SODIUM CHLORIDE 0.9 % (FLUSH) 0.9 %
10 SYRINGE (ML) INJECTION PRN
Status: DISCONTINUED | OUTPATIENT
Start: 2020-06-04 | End: 2020-06-07 | Stop reason: HOSPADM

## 2020-06-04 RX ORDER — AMOXICILLIN 500 MG/1
500 CAPSULE ORAL 3 TIMES DAILY
Status: ON HOLD | COMMUNITY
End: 2020-06-07 | Stop reason: HOSPADM

## 2020-06-04 RX ORDER — OXYCODONE HYDROCHLORIDE 15 MG/1
15 TABLET ORAL EVERY 8 HOURS PRN
COMMUNITY
End: 2020-09-30 | Stop reason: ALTCHOICE

## 2020-06-04 RX ORDER — TETRAHYDROZOLINE HCL 0.05 %
1 DROPS OPHTHALMIC (EYE) EVERY 4 HOURS PRN
Status: DISCONTINUED | OUTPATIENT
Start: 2020-06-04 | End: 2020-06-07 | Stop reason: HOSPADM

## 2020-06-04 RX ADMIN — VANCOMYCIN HYDROCHLORIDE 1500 MG: 1.5 INJECTION, POWDER, LYOPHILIZED, FOR SOLUTION INTRAVENOUS at 20:26

## 2020-06-04 RX ADMIN — IOVERSOL 75 ML: 741 INJECTION INTRA-ARTERIAL; INTRAVENOUS at 08:46

## 2020-06-04 RX ADMIN — SODIUM CHLORIDE: 9 INJECTION, SOLUTION INTRAVENOUS at 23:30

## 2020-06-04 RX ADMIN — VANCOMYCIN HYDROCHLORIDE 2500 MG: 1 INJECTION, POWDER, LYOPHILIZED, FOR SOLUTION INTRAVENOUS at 08:23

## 2020-06-04 RX ADMIN — COLCHICINE 0.6 MG: 0.6 TABLET, FILM COATED ORAL at 13:44

## 2020-06-04 RX ADMIN — CEFEPIME 2 G: 2 INJECTION, POWDER, FOR SOLUTION INTRAVENOUS at 13:58

## 2020-06-04 RX ADMIN — SODIUM CHLORIDE 80 ML: 9 INJECTION, SOLUTION INTRAVENOUS at 08:46

## 2020-06-04 RX ADMIN — SODIUM CHLORIDE: 9 INJECTION, SOLUTION INTRAVENOUS at 12:28

## 2020-06-04 RX ADMIN — Medication 10 ML: at 08:46

## 2020-06-04 RX ADMIN — ACETAMINOPHEN 650 MG: 325 TABLET, FILM COATED ORAL at 20:29

## 2020-06-04 RX ADMIN — TETRAHYDROZOLINE HCL 1 DROP: 0.05 SOLUTION/ DROPS OPHTHALMIC at 20:26

## 2020-06-04 RX ADMIN — SODIUM CHLORIDE 1000 ML: 9 INJECTION, SOLUTION INTRAVENOUS at 07:54

## 2020-06-04 RX ADMIN — ACETAMINOPHEN 650 MG: 325 TABLET, FILM COATED ORAL at 07:38

## 2020-06-04 RX ADMIN — CEFTRIAXONE SODIUM 1 G: 1 INJECTION, POWDER, FOR SOLUTION INTRAMUSCULAR; INTRAVENOUS at 07:52

## 2020-06-04 ASSESSMENT — ENCOUNTER SYMPTOMS
TROUBLE SWALLOWING: 0
VOICE CHANGE: 0
STRIDOR: 0
RESPIRATORY NEGATIVE: 1
EYE PAIN: 0
EYES NEGATIVE: 1
RECTAL PAIN: 0
ANAL BLEEDING: 0
EYE REDNESS: 0
CHOKING: 0
BACK PAIN: 0
ROS SKIN COMMENTS: ABSCESS
EYE DISCHARGE: 1
ABDOMINAL DISTENTION: 0
COLOR CHANGE: 0
BLOOD IN STOOL: 0
GASTROINTESTINAL NEGATIVE: 1

## 2020-06-04 ASSESSMENT — PAIN DESCRIPTION - DESCRIPTORS
DESCRIPTORS: THROBBING
DESCRIPTORS: THROBBING

## 2020-06-04 ASSESSMENT — PAIN SCALES - GENERAL
PAINLEVEL_OUTOF10: 5
PAINLEVEL_OUTOF10: 5
PAINLEVEL_OUTOF10: 7
PAINLEVEL_OUTOF10: 0
PAINLEVEL_OUTOF10: 5
PAINLEVEL_OUTOF10: 7

## 2020-06-04 ASSESSMENT — PAIN DESCRIPTION - LOCATION
LOCATION: FACE
LOCATION: FACE;EYE
LOCATION: FACE;EYE
LOCATION: FACE

## 2020-06-04 ASSESSMENT — PAIN DESCRIPTION - PAIN TYPE
TYPE: ACUTE PAIN
TYPE: ACUTE PAIN

## 2020-06-04 ASSESSMENT — PAIN DESCRIPTION - ORIENTATION
ORIENTATION: RIGHT

## 2020-06-04 NOTE — H&P
Psychiatric/Behavioral: Negative for agitation, behavioral problems, hallucinations, self-injury and suicidal ideas. Code Status:  Full Code    Physical Exam:     Vitals:  /89   Pulse 120   Temp 97.7 °F (36.5 °C) (Oral)   Resp 16   Ht 5' 8\" (1.727 m)   Wt 207 lb (93.9 kg)   SpO2 100%   BMI 31.47 kg/m²   Temp (24hrs), Av.9 °F (37.2 °C), Min:97.7 °F (36.5 °C), Max:100.4 °F (38 °C)        Physical Exam  Vitals signs reviewed. Constitutional:       Appearance: Normal appearance. He is not diaphoretic. HENT:      Head: Normocephalic and atraumatic. Right Ear: External ear normal.      Left Ear: External ear normal.      Nose: Nose normal.      Mouth/Throat:      Mouth: Mucous membranes are moist.      Pharynx: Oropharynx is clear. Eyes:      Extraocular Movements: Extraocular movements intact. Conjunctiva/sclera: Conjunctivae normal.      Pupils: Pupils are equal, round, and reactive to light. Neck:      Musculoskeletal: Normal range of motion and neck supple. No neck rigidity. Cardiovascular:      Rate and Rhythm: Normal rate and regular rhythm. Pulses: Normal pulses. Heart sounds: Normal heart sounds. Pulmonary:      Effort: Pulmonary effort is normal.      Breath sounds: Normal breath sounds. Abdominal:      General: Bowel sounds are normal. There is no distension. Palpations: Abdomen is soft. Musculoskeletal: Normal range of motion. General: No tenderness or deformity. Skin:     General: Skin is warm and dry. Capillary Refill: Capillary refill takes less than 2 seconds. Comments: Right upper face erythema and warmth surrounding right eye and right eyelids. Clear drainage from right eye.  2 mm papule noted right upper cheek below right eye   Neurological:      General: No focal deficit present. Mental Status: Mental status is at baseline.    Psychiatric:         Mood and Affect: Mood normal.         Behavior: Behavior normal.

## 2020-06-04 NOTE — PLAN OF CARE
Problem: Pain:  Goal: Pain level will decrease  Description: Pain level will decrease  Outcome: Ongoing  Note: Pain assessed with each interaction. Meds given, see MAR. Will continue to monitor.

## 2020-06-04 NOTE — ED PROVIDER NOTES
DISPOSITION Admitted 06/04/2020 10:45:18 AM      PATIENT REFERRED TO:  Christiano Ferguson MD  [facility], [facility],[address]  [address]  [phone]          DISCHARGE MEDICATIONS:  Current Discharge Medication List        Bertha White MD  Attending Emergency Physician                    Karely Vázquez MD  06/04/20 4732
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
## 2020-06-05 LAB
ABSOLUTE EOS #: 0.2 K/UL (ref 0–0.4)
ABSOLUTE IMMATURE GRANULOCYTE: ABNORMAL K/UL (ref 0–0.3)
ABSOLUTE LYMPH #: 1.6 K/UL (ref 1–4.8)
ABSOLUTE MONO #: 0.9 K/UL (ref 0.1–1.3)
ALBUMIN SERPL-MCNC: 4 G/DL (ref 3.5–5.2)
ALBUMIN/GLOBULIN RATIO: ABNORMAL (ref 1–2.5)
ALP BLD-CCNC: 45 U/L (ref 40–129)
ALT SERPL-CCNC: 19 U/L (ref 5–41)
ANION GAP SERPL CALCULATED.3IONS-SCNC: 12 MMOL/L (ref 9–17)
AST SERPL-CCNC: 15 U/L
BASOPHILS # BLD: 1 % (ref 0–2)
BASOPHILS ABSOLUTE: 0.1 K/UL (ref 0–0.2)
BILIRUB SERPL-MCNC: 0.42 MG/DL (ref 0.3–1.2)
BUN BLDV-MCNC: 9 MG/DL (ref 6–20)
BUN/CREAT BLD: ABNORMAL (ref 9–20)
CALCIUM SERPL-MCNC: 8.9 MG/DL (ref 8.6–10.4)
CHLORIDE BLD-SCNC: 103 MMOL/L (ref 98–107)
CO2: 22 MMOL/L (ref 20–31)
CREAT SERPL-MCNC: 0.82 MG/DL (ref 0.7–1.2)
CULTURE: NORMAL
DIFFERENTIAL TYPE: ABNORMAL
DIRECT EXAM: NORMAL
EOSINOPHILS RELATIVE PERCENT: 2 % (ref 0–4)
GFR AFRICAN AMERICAN: >60 ML/MIN
GFR NON-AFRICAN AMERICAN: >60 ML/MIN
GFR SERPL CREATININE-BSD FRML MDRD: ABNORMAL ML/MIN/{1.73_M2}
GFR SERPL CREATININE-BSD FRML MDRD: ABNORMAL ML/MIN/{1.73_M2}
GLUCOSE BLD-MCNC: 110 MG/DL (ref 70–99)
HCT VFR BLD CALC: 39.7 % (ref 41–53)
HEMOGLOBIN: 13.3 G/DL (ref 13.5–17.5)
IMMATURE GRANULOCYTES: ABNORMAL %
LYMPHOCYTES # BLD: 19 % (ref 24–44)
Lab: NORMAL
MCH RBC QN AUTO: 29.3 PG (ref 26–34)
MCHC RBC AUTO-ENTMCNC: 33.5 G/DL (ref 31–37)
MCV RBC AUTO: 87.4 FL (ref 80–100)
MONOCYTES # BLD: 11 % (ref 1–7)
NRBC AUTOMATED: ABNORMAL PER 100 WBC
PDW BLD-RTO: 16.5 % (ref 11.5–14.9)
PLATELET # BLD: 200 K/UL (ref 150–450)
PLATELET ESTIMATE: ABNORMAL
PMV BLD AUTO: 7.4 FL (ref 6–12)
POTASSIUM SERPL-SCNC: 4 MMOL/L (ref 3.7–5.3)
RBC # BLD: 4.54 M/UL (ref 4.5–5.9)
RBC # BLD: ABNORMAL 10*6/UL
SEG NEUTROPHILS: 67 % (ref 36–66)
SEGMENTED NEUTROPHILS ABSOLUTE COUNT: 5.8 K/UL (ref 1.3–9.1)
SODIUM BLD-SCNC: 137 MMOL/L (ref 135–144)
SPECIMEN DESCRIPTION: NORMAL
TOTAL PROTEIN: 7.2 G/DL (ref 6.4–8.3)
VANCOMYCIN TROUGH DATE LAST DOSE: ABNORMAL
VANCOMYCIN TROUGH DOSE AMOUNT: 1500
VANCOMYCIN TROUGH TIME LAST DOSE: 826
VANCOMYCIN TROUGH: 8.5 UG/ML (ref 10–20)
WBC # BLD: 8.5 K/UL (ref 3.5–11)
WBC # BLD: ABNORMAL 10*3/UL

## 2020-06-05 PROCEDURE — 99232 SBSQ HOSP IP/OBS MODERATE 35: CPT | Performed by: INTERNAL MEDICINE

## 2020-06-05 PROCEDURE — 6360000002 HC RX W HCPCS: Performed by: EMERGENCY MEDICINE

## 2020-06-05 PROCEDURE — 85025 COMPLETE CBC W/AUTO DIFF WBC: CPT

## 2020-06-05 PROCEDURE — 2580000003 HC RX 258: Performed by: INTERNAL MEDICINE

## 2020-06-05 PROCEDURE — 1200000000 HC SEMI PRIVATE

## 2020-06-05 PROCEDURE — 80202 ASSAY OF VANCOMYCIN: CPT

## 2020-06-05 PROCEDURE — 87076 CULTURE ANAEROBE IDENT EACH: CPT

## 2020-06-05 PROCEDURE — 6370000000 HC RX 637 (ALT 250 FOR IP): Performed by: OPHTHALMOLOGY

## 2020-06-05 PROCEDURE — 2580000003 HC RX 258: Performed by: EMERGENCY MEDICINE

## 2020-06-05 PROCEDURE — 87075 CULTR BACTERIA EXCEPT BLOOD: CPT

## 2020-06-05 PROCEDURE — 87070 CULTURE OTHR SPECIMN AEROBIC: CPT

## 2020-06-05 PROCEDURE — 6370000000 HC RX 637 (ALT 250 FOR IP): Performed by: FAMILY MEDICINE

## 2020-06-05 PROCEDURE — 6360000002 HC RX W HCPCS: Performed by: INTERNAL MEDICINE

## 2020-06-05 PROCEDURE — 36415 COLL VENOUS BLD VENIPUNCTURE: CPT

## 2020-06-05 PROCEDURE — 86403 PARTICLE AGGLUT ANTBDY SCRN: CPT

## 2020-06-05 PROCEDURE — 80053 COMPREHEN METABOLIC PANEL: CPT

## 2020-06-05 RX ORDER — POLYMYXIN B SULFATE AND TRIMETHOPRIM 1; 10000 MG/ML; [USP'U]/ML
1 SOLUTION OPHTHALMIC 4 TIMES DAILY
Status: DISCONTINUED | OUTPATIENT
Start: 2020-06-05 | End: 2020-06-07 | Stop reason: HOSPADM

## 2020-06-05 RX ORDER — CIPROFLOXACIN 500 MG/1
500 TABLET, FILM COATED ORAL 2 TIMES DAILY
Qty: 14 TABLET | Refills: 0 | Status: SHIPPED | OUTPATIENT
Start: 2020-06-05 | End: 2020-06-07 | Stop reason: HOSPADM

## 2020-06-05 RX ORDER — LINEZOLID 600 MG/1
600 TABLET, FILM COATED ORAL 2 TIMES DAILY
Qty: 14 TABLET | Refills: 0 | Status: SHIPPED | OUTPATIENT
Start: 2020-06-05 | End: 2020-06-12

## 2020-06-05 RX ADMIN — CEFEPIME 2 G: 2 INJECTION, POWDER, FOR SOLUTION INTRAVENOUS at 02:07

## 2020-06-05 RX ADMIN — COLCHICINE 0.6 MG: 0.6 TABLET, FILM COATED ORAL at 08:26

## 2020-06-05 RX ADMIN — POLYMYXIN B SULFATE, TRIMETHOPRIM SULFATE 1 DROP: 10000; 1 SOLUTION/ DROPS OPHTHALMIC at 17:29

## 2020-06-05 RX ADMIN — POLYMYXIN B SULFATE, TRIMETHOPRIM SULFATE 1 DROP: 10000; 1 SOLUTION/ DROPS OPHTHALMIC at 20:50

## 2020-06-05 RX ADMIN — VANCOMYCIN HYDROCHLORIDE 1500 MG: 1.5 INJECTION, POWDER, LYOPHILIZED, FOR SOLUTION INTRAVENOUS at 20:50

## 2020-06-05 RX ADMIN — POLYMYXIN B SULFATE, TRIMETHOPRIM SULFATE 1 DROP: 10000; 1 SOLUTION/ DROPS OPHTHALMIC at 14:15

## 2020-06-05 RX ADMIN — VANCOMYCIN HYDROCHLORIDE 1500 MG: 1.5 INJECTION, POWDER, LYOPHILIZED, FOR SOLUTION INTRAVENOUS at 08:26

## 2020-06-05 RX ADMIN — CEFEPIME 2 G: 2 INJECTION, POWDER, FOR SOLUTION INTRAVENOUS at 14:15

## 2020-06-05 ASSESSMENT — ENCOUNTER SYMPTOMS
BLOOD IN STOOL: 0
COLOR CHANGE: 0
CHOKING: 0
EYE REDNESS: 0
ABDOMINAL DISTENTION: 0
VOICE CHANGE: 0
EYE PAIN: 0
STRIDOR: 0
RECTAL PAIN: 0
TROUBLE SWALLOWING: 0
PHOTOPHOBIA: 0
EYE DISCHARGE: 1
ANAL BLEEDING: 0
BACK PAIN: 0
EYE ITCHING: 0

## 2020-06-05 ASSESSMENT — PAIN SCALES - GENERAL: PAINLEVEL_OUTOF10: 4
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
## 2020-06-05 NOTE — FLOWSHEET NOTE
06/05/20 1720   Encounter Summary   Services provided to: Patient;Significant other   Referral/Consult From: Rounding   Support System Family members   Continue Visiting   (6/5/2020)   Complexity of Encounter Low   Length of Encounter 15 minutes   Routine   Type Initial   Assessment Approachable; Hopeful   Intervention Active listening;Nurtured hope;[place]   Outcome Expressed gratitude
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
## 2020-06-05 NOTE — PROGRESS NOTES
Pt calls out to say his abscess on right cheek has opened to reveal a pus-like drainage. Wound culture obtained and sent to lab.
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
## 2020-06-05 NOTE — PROGRESS NOTES
Progress Note    6/5/2020   2:34 PM    Name:  Nadine Monson  MRN:    [id number]     Acct:     [de-identified]   Room:  2045/2045-01  IP Day: 1     Admit Date: 6/4/2020  7:21 AM  PCP: Aaron Rosas MD    Subjective:     C/C:   Chief Complaint   Patient presents with    Facial Swelling       Interval History: Status: improved. Patient right face redness swelling has significantly improved. Patient denies eye pain double vision, foreign body sensation or irritation. Patient reported scant intermittent white discharge from right eye inner corner. Blood culture pending. EYE fluid culture pending. Vital signs stable    ROS:   all 10 systems reviewed and are negative except as noted    Review of Systems   Constitutional: Negative for appetite change, chills and diaphoresis. HENT: Negative for drooling, ear pain, trouble swallowing and voice change. Eyes: Positive for discharge (Scant, white). Negative for photophobia, pain, redness, itching and visual disturbance. Respiratory: Negative for choking and stridor. Cardiovascular: Negative for chest pain and palpitations. Gastrointestinal: Negative for abdominal distention, anal bleeding, blood in stool and rectal pain. Endocrine: Negative for polyphagia and polyuria. Genitourinary: Negative for dysuria, flank pain, hematuria and urgency. Musculoskeletal: Negative for back pain, myalgias and neck stiffness. Skin: Negative for color change, pallor and rash. Redness right upper cheek   Allergic/Immunologic: Negative for environmental allergies and food allergies. Neurological: Negative for tremors, seizures, facial asymmetry and numbness. Hematological: Negative for adenopathy. Does not bruise/bleed easily. Psychiatric/Behavioral: Negative for agitation, behavioral problems, hallucinations, self-injury and suicidal ideas. Medications: Allergies:    Allergies   Allergen Reactions    Seasonal        Current Meds: 0.30 k/uL    WBC Morphology NOT REPORTED     RBC Morphology NOT REPORTED     Platelet Estimate NOT REPORTED     Seg Neutrophils 67 (H) 36 - 66 %    Lymphocytes 19 (L) 24 - 44 %    Monocytes 11 (H) 1 - 7 %    Eosinophils % 2 0 - 4 %    Basophils 1 0 - 2 %    Segs Absolute 5.80 1.3 - 9.1 k/uL    Absolute Lymph # 1.60 1.0 - 4.8 k/uL    Absolute Mono # 0.90 0.1 - 1.3 k/uL    Absolute Eos # 0.20 0.0 - 0.4 k/uL    Basophils Absolute 0.10 0.0 - 0.2 k/uL   Comprehensive Metabolic Panel w/ Reflex to MG    Collection Time: 20  6:20 AM   Result Value Ref Range    Glucose 110 (H) 70 - 99 mg/dL    BUN 9 6 - 20 mg/dL    CREATININE 0.82 0.70 - 1.20 mg/dL    Bun/Cre Ratio NOT REPORTED 9 - 20    Calcium 8.9 8.6 - 10.4 mg/dL    Sodium 137 135 - 144 mmol/L    Potassium 4.0 3.7 - 5.3 mmol/L    Chloride 103 98 - 107 mmol/L    CO2 22 20 - 31 mmol/L    Anion Gap 12 9 - 17 mmol/L    Alkaline Phosphatase 45 40 - 129 U/L    ALT 19 5 - 41 U/L    AST 15 <40 U/L    Total Bilirubin 0.42 0.3 - 1.2 mg/dL    Total Protein 7.2 6.4 - 8.3 g/dL    Alb 4.0 3.5 - 5.2 g/dL    Albumin/Globulin Ratio NOT REPORTED 1.0 - 2.5    GFR Non-African American >60 >60 mL/min    GFR African American >60 >60 mL/min    GFR Comment          GFR Staging NOT REPORTED    Culture, Anaerobic and Aerobic    Collection Time: 20  9:24 AM   Result Value Ref Range    Specimen Description . ABSCESS RIGHT     Special Requests NOT REPORTED     Direct Exam FEW NEUTROPHILS (A)     Direct Exam NO BACTERIA SEEN     Culture PENDING        Physical Examination:        Vitals:  /70   Pulse 98   Temp 98.8 °F (37.1 °C) (Oral)   Resp 16   Ht 5' 8\" (1.727 m)   Wt 207 lb (93.9 kg)   SpO2 99%   BMI 31.47 kg/m²   Temp (24hrs), Av.8 °F (37.1 °C), Min:98.2 °F (36.8 °C), Max:99.7 °F (37.6 °C)    No results for input(s): POCGLU in the last 72 hours. Physical Exam  Vitals signs reviewed. Constitutional:       Appearance: Normal appearance. He is not diaphoretic. HENT:      Head: Normocephalic and atraumatic. Right Ear: External ear normal.      Left Ear: External ear normal.      Nose: Nose normal.      Mouth/Throat:      Mouth: Mucous membranes are moist.      Pharynx: Oropharynx is clear. Eyes:      Extraocular Movements: Extraocular movements intact. Conjunctiva/sclera: Conjunctivae normal.      Pupils: Pupils are equal, round, and reactive to light. Neck:      Musculoskeletal: Normal range of motion and neck supple. No neck rigidity. Cardiovascular:      Rate and Rhythm: Normal rate and regular rhythm. Pulses: Normal pulses. Heart sounds: Normal heart sounds. Pulmonary:      Effort: Pulmonary effort is normal.      Breath sounds: Normal breath sounds. Abdominal:      General: Bowel sounds are normal. There is no distension. Palpations: Abdomen is soft. Musculoskeletal: Normal range of motion. General: No tenderness or deformity. Skin:     General: Skin is warm and dry. Capillary Refill: Capillary refill takes less than 2 seconds. Coloration: Skin is not jaundiced. Comments: Redness right upper cheek surrounding right eye markedly improved. 5 mm scab. No active discharge at time of exam   Neurological:      General: No focal deficit present. Mental Status: Mental status is at baseline. Psychiatric:         Mood and Affect: Mood normal.         Behavior: Behavior normal.         Assessment:        Primary Problem  Facial cellulitis     Principal Problem:    Facial cellulitis  Active Problems:    Gout    Hypertriglyceridemia    Obesity (BMI 30.0-34. 9)    Preseptal cellulitis of right eye  Resolved Problems:    * No resolved hospital problems. *      Past Medical History:   Diagnosis Date    Hypertriglyceridemia 5/19/2017    Obesity (BMI 30.0-34.9) 5/19/2017        Plan:        1. IV vancomycin, IV cefepime  2. Polytrim eyedrops  3. Follow blood cultures  4.  Ophthalmology,ID, input noted
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
## 2020-06-06 LAB
-: NORMAL
ABSOLUTE EOS #: 0.2 K/UL (ref 0–0.4)
ABSOLUTE IMMATURE GRANULOCYTE: ABNORMAL K/UL (ref 0–0.3)
ABSOLUTE LYMPH #: 1.5 K/UL (ref 1–4.8)
ABSOLUTE MONO #: 0.6 K/UL (ref 0.1–1.3)
ALBUMIN SERPL-MCNC: 4.2 G/DL (ref 3.5–5.2)
ALBUMIN/GLOBULIN RATIO: ABNORMAL (ref 1–2.5)
ALP BLD-CCNC: 52 U/L (ref 40–129)
ALT SERPL-CCNC: 25 U/L (ref 5–41)
ANION GAP SERPL CALCULATED.3IONS-SCNC: 11 MMOL/L (ref 9–17)
AST SERPL-CCNC: 17 U/L
BASOPHILS # BLD: 1 % (ref 0–2)
BASOPHILS ABSOLUTE: 0 K/UL (ref 0–0.2)
BILIRUB SERPL-MCNC: 0.21 MG/DL (ref 0.3–1.2)
BUN BLDV-MCNC: 10 MG/DL (ref 6–20)
BUN/CREAT BLD: ABNORMAL (ref 9–20)
CALCIUM SERPL-MCNC: 9.6 MG/DL (ref 8.6–10.4)
CHLORIDE BLD-SCNC: 104 MMOL/L (ref 98–107)
CO2: 24 MMOL/L (ref 20–31)
CREAT SERPL-MCNC: 0.83 MG/DL (ref 0.7–1.2)
DIFFERENTIAL TYPE: ABNORMAL
EOSINOPHILS RELATIVE PERCENT: 4 % (ref 0–4)
GFR AFRICAN AMERICAN: >60 ML/MIN
GFR NON-AFRICAN AMERICAN: >60 ML/MIN
GFR SERPL CREATININE-BSD FRML MDRD: ABNORMAL ML/MIN/{1.73_M2}
GFR SERPL CREATININE-BSD FRML MDRD: ABNORMAL ML/MIN/{1.73_M2}
GLUCOSE BLD-MCNC: 128 MG/DL (ref 70–99)
HCT VFR BLD CALC: 41.5 % (ref 41–53)
HEMOGLOBIN: 13.7 G/DL (ref 13.5–17.5)
IMMATURE GRANULOCYTES: ABNORMAL %
LYMPHOCYTES # BLD: 29 % (ref 24–44)
MCH RBC QN AUTO: 29.2 PG (ref 26–34)
MCHC RBC AUTO-ENTMCNC: 33 G/DL (ref 31–37)
MCV RBC AUTO: 88.5 FL (ref 80–100)
MONOCYTES # BLD: 11 % (ref 1–7)
NRBC AUTOMATED: ABNORMAL PER 100 WBC
PDW BLD-RTO: 16.2 % (ref 11.5–14.9)
PLATELET # BLD: 202 K/UL (ref 150–450)
PLATELET ESTIMATE: ABNORMAL
PMV BLD AUTO: 7.6 FL (ref 6–12)
POTASSIUM SERPL-SCNC: 4.3 MMOL/L (ref 3.7–5.3)
RBC # BLD: 4.69 M/UL (ref 4.5–5.9)
RBC # BLD: ABNORMAL 10*6/UL
REASON FOR REJECTION: NORMAL
SEG NEUTROPHILS: 55 % (ref 36–66)
SEGMENTED NEUTROPHILS ABSOLUTE COUNT: 3 K/UL (ref 1.3–9.1)
SODIUM BLD-SCNC: 139 MMOL/L (ref 135–144)
TOTAL PROTEIN: 7.7 G/DL (ref 6.4–8.3)
WBC # BLD: 5.4 K/UL (ref 3.5–11)
WBC # BLD: ABNORMAL 10*3/UL
ZZ NTE CLEAN UP: ORDERED TEST: NORMAL
ZZ NTE WITH NAME CLEAN UP: SPECIMEN SOURCE: NORMAL

## 2020-06-06 PROCEDURE — 99232 SBSQ HOSP IP/OBS MODERATE 35: CPT | Performed by: NURSE PRACTITIONER

## 2020-06-06 PROCEDURE — 85025 COMPLETE CBC W/AUTO DIFF WBC: CPT

## 2020-06-06 PROCEDURE — 6360000002 HC RX W HCPCS: Performed by: INTERNAL MEDICINE

## 2020-06-06 PROCEDURE — 6360000002 HC RX W HCPCS: Performed by: EMERGENCY MEDICINE

## 2020-06-06 PROCEDURE — 6370000000 HC RX 637 (ALT 250 FOR IP): Performed by: FAMILY MEDICINE

## 2020-06-06 PROCEDURE — 80053 COMPREHEN METABOLIC PANEL: CPT

## 2020-06-06 PROCEDURE — 36415 COLL VENOUS BLD VENIPUNCTURE: CPT

## 2020-06-06 PROCEDURE — 1200000000 HC SEMI PRIVATE

## 2020-06-06 PROCEDURE — 2580000003 HC RX 258: Performed by: EMERGENCY MEDICINE

## 2020-06-06 PROCEDURE — 2580000003 HC RX 258: Performed by: FAMILY MEDICINE

## 2020-06-06 PROCEDURE — 2580000003 HC RX 258: Performed by: INTERNAL MEDICINE

## 2020-06-06 RX ADMIN — VANCOMYCIN HYDROCHLORIDE 1500 MG: 1.5 INJECTION, POWDER, LYOPHILIZED, FOR SOLUTION INTRAVENOUS at 08:13

## 2020-06-06 RX ADMIN — SODIUM CHLORIDE: 9 INJECTION, SOLUTION INTRAVENOUS at 03:36

## 2020-06-06 RX ADMIN — SODIUM CHLORIDE: 9 INJECTION, SOLUTION INTRAVENOUS at 20:35

## 2020-06-06 RX ADMIN — POLYMYXIN B SULFATE, TRIMETHOPRIM SULFATE 1 DROP: 10000; 1 SOLUTION/ DROPS OPHTHALMIC at 20:39

## 2020-06-06 RX ADMIN — POLYMYXIN B SULFATE, TRIMETHOPRIM SULFATE 1 DROP: 10000; 1 SOLUTION/ DROPS OPHTHALMIC at 17:26

## 2020-06-06 RX ADMIN — CEFEPIME 2 G: 2 INJECTION, POWDER, FOR SOLUTION INTRAVENOUS at 02:35

## 2020-06-06 RX ADMIN — VANCOMYCIN HYDROCHLORIDE 1500 MG: 1.5 INJECTION, POWDER, LYOPHILIZED, FOR SOLUTION INTRAVENOUS at 20:39

## 2020-06-06 RX ADMIN — POLYMYXIN B SULFATE, TRIMETHOPRIM SULFATE 1 DROP: 10000; 1 SOLUTION/ DROPS OPHTHALMIC at 08:00

## 2020-06-06 RX ADMIN — COLCHICINE 0.6 MG: 0.6 TABLET, FILM COATED ORAL at 07:59

## 2020-06-06 RX ADMIN — POLYMYXIN B SULFATE, TRIMETHOPRIM SULFATE 1 DROP: 10000; 1 SOLUTION/ DROPS OPHTHALMIC at 13:38

## 2020-06-06 RX ADMIN — CEFEPIME 2 G: 2 INJECTION, POWDER, FOR SOLUTION INTRAVENOUS at 13:38

## 2020-06-06 ASSESSMENT — ENCOUNTER SYMPTOMS
BACK PAIN: 0
BLOOD IN STOOL: 0
RESPIRATORY NEGATIVE: 1
EYE PAIN: 1
PHOTOPHOBIA: 0
STRIDOR: 0
EYE DISCHARGE: 0
EYE REDNESS: 0
COLOR CHANGE: 0
EYE REDNESS: 1
CHOKING: 0
GASTROINTESTINAL NEGATIVE: 1
RECTAL PAIN: 0
EYE DISCHARGE: 1
EYE ITCHING: 0
TROUBLE SWALLOWING: 0
ABDOMINAL DISTENTION: 0
EYE PAIN: 0
VOICE CHANGE: 0
ALLERGIC/IMMUNOLOGIC NEGATIVE: 1
ANAL BLEEDING: 0

## 2020-06-06 ASSESSMENT — PAIN DESCRIPTION - LOCATION: LOCATION: FACE

## 2020-06-06 ASSESSMENT — PAIN SCALES - GENERAL
PAINLEVEL_OUTOF10: 1
PAINLEVEL_OUTOF10: 1

## 2020-06-06 ASSESSMENT — PAIN DESCRIPTION - DESCRIPTORS: DESCRIPTORS: ACHING

## 2020-06-06 ASSESSMENT — PAIN DESCRIPTION - PAIN TYPE: TYPE: ACUTE PAIN

## 2020-06-06 ASSESSMENT — PAIN DESCRIPTION - ORIENTATION: ORIENTATION: RIGHT

## 2020-06-06 NOTE — PROGRESS NOTES
Swelling has gone down, but still needs improvement. Dr. Harjit Hardy would like to keep one more day on IV antibiotics. Plan discharge tomorrow on PO antibiotics.

## 2020-06-06 NOTE — PLAN OF CARE
Problem: Pain:  Goal: Pain level will decrease  Description: Pain level will decrease  6/6/2020 1629 by Александр Cano RN  Outcome: Met This Shift  Note: No complaints of pain or discomfort  6/6/2020 0415 by Morgna Schneider RN  Outcome: Ongoing  Goal: Control of acute pain  Description: Control of acute pain  6/6/2020 0415 by Morgan Schneider RN  Outcome: Ongoing  Goal: Control of chronic pain  Description: Control of chronic pain  6/6/2020 0415 by Morgan Schneider RN  Outcome: Ongoing     Problem: Skin Integrity:  Goal: Will show no infection signs and symptoms  Description: Will show no infection signs and symptoms  6/6/2020 1629 by Александр Cano RN  Outcome: Ongoing  6/6/2020 0415 by Morgan Schneider RN  Outcome: Ongoing  Goal: Absence of new skin breakdown  Description: Absence of new skin breakdown  6/6/2020 0415 by Morgan Schneider RN  Outcome: Ongoing     Problem: Physical Regulation:  Goal: Will remain free from infection  Description: Will remain free from infection  6/6/2020 1629 by Александр Cano RN  Outcome: Ongoing  6/6/2020 0415 by Morgan Schneider RN  Outcome: Ongoing

## 2020-06-06 NOTE — PROGRESS NOTES
Writer called pharmacy [facility], to ask if this 2000 dose of Vanco was okay to give since Vanco trough was low. He states he wasn't aware that there was a trough done on this patient, but to hang the dose the writer has and then he will adjust after he looks into it.

## 2020-06-06 NOTE — PROGRESS NOTES
- 7 %    Eosinophils % 4 0 - 4 %    Basophils 1 0 - 2 %    Segs Absolute 3.00 1.3 - 9.1 k/uL    Absolute Lymph # 1.50 1.0 - 4.8 k/uL    Absolute Mono # 0.60 0.1 - 1.3 k/uL    Absolute Eos # 0.20 0.0 - 0.4 k/uL    Basophils Absolute 0.00 0.0 - 0.2 k/uL   SPECIMEN REJECTION    Collection Time: 20  6:31 AM   Result Value Ref Range    Specimen Source . BLOOD     Ordered Test CMPX     Reason for Rejection Unable to perform testing: Specimen hemolyzed. - NOT REPORTED    Comprehensive Metabolic Panel w/ Reflex to MG    Collection Time: 20  7:26 AM   Result Value Ref Range    Glucose 128 (H) 70 - 99 mg/dL    BUN 10 6 - 20 mg/dL    CREATININE 0.83 0.70 - 1.20 mg/dL    Bun/Cre Ratio NOT REPORTED 9 - 20    Calcium 9.6 8.6 - 10.4 mg/dL    Sodium 139 135 - 144 mmol/L    Potassium 4.3 3.7 - 5.3 mmol/L    Chloride 104 98 - 107 mmol/L    CO2 24 20 - 31 mmol/L    Anion Gap 11 9 - 17 mmol/L    Alkaline Phosphatase 52 40 - 129 U/L    ALT 25 5 - 41 U/L    AST 17 <40 U/L    Total Bilirubin 0.21 (L) 0.3 - 1.2 mg/dL    Total Protein 7.7 6.4 - 8.3 g/dL    Alb 4.2 3.5 - 5.2 g/dL    Albumin/Globulin Ratio NOT REPORTED 1.0 - 2.5    GFR Non-African American >60 >60 mL/min    GFR African American >60 >60 mL/min    GFR Comment          GFR Staging NOT REPORTED        Physical Examination:        Vitals:  /77   Pulse 99   Temp 98.4 °F (36.9 °C) (Oral)   Resp 16   Ht 5' 8\" (1.727 m)   Wt 207 lb (93.9 kg)   SpO2 99%   BMI 31.47 kg/m²   Temp (24hrs), Av.1 °F (36.7 °C), Min:97.3 °F (36.3 °C), Max:98.6 °F (37 °C)    No results for input(s): POCGLU in the last 72 hours. Physical Exam  Vitals signs reviewed. Constitutional:       Appearance: Normal appearance. He is not diaphoretic. HENT:      Head: Normocephalic and atraumatic.       Right Ear: External ear normal.      Left Ear: External ear normal.      Nose: Nose normal.      Mouth/Throat:      Mouth: Mucous membranes are moist.      Pharynx: Oropharynx is facial cellulitis  Still has significant swelling  Indurated  No visual defect  No fever  We will continue IV cefepime and vancomycin for 1 more day  Culture MRSA positive  Plan discharge in a.m. if swelling has improved     electronically signed by Marquise Woods MD
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
## 2020-06-07 VITALS
WEIGHT: 205.69 LBS | HEART RATE: 86 BPM | RESPIRATION RATE: 14 BRPM | TEMPERATURE: 97.5 F | HEIGHT: 68 IN | OXYGEN SATURATION: 100 % | DIASTOLIC BLOOD PRESSURE: 67 MMHG | BODY MASS INDEX: 31.17 KG/M2 | SYSTOLIC BLOOD PRESSURE: 109 MMHG

## 2020-06-07 LAB
ABSOLUTE EOS #: 0.2 K/UL (ref 0–0.4)
ABSOLUTE IMMATURE GRANULOCYTE: ABNORMAL K/UL (ref 0–0.3)
ABSOLUTE LYMPH #: 1.7 K/UL (ref 1–4.8)
ABSOLUTE MONO #: 0.6 K/UL (ref 0.1–1.3)
ALBUMIN SERPL-MCNC: 4.1 G/DL (ref 3.5–5.2)
ALBUMIN/GLOBULIN RATIO: ABNORMAL (ref 1–2.5)
ALP BLD-CCNC: 47 U/L (ref 40–129)
ALT SERPL-CCNC: 24 U/L (ref 5–41)
ANION GAP SERPL CALCULATED.3IONS-SCNC: 13 MMOL/L (ref 9–17)
AST SERPL-CCNC: 15 U/L
BASOPHILS # BLD: 1 % (ref 0–2)
BASOPHILS ABSOLUTE: 0.1 K/UL (ref 0–0.2)
BILIRUB SERPL-MCNC: 0.26 MG/DL (ref 0.3–1.2)
BUN BLDV-MCNC: 11 MG/DL (ref 6–20)
BUN/CREAT BLD: ABNORMAL (ref 9–20)
CALCIUM SERPL-MCNC: 9.6 MG/DL (ref 8.6–10.4)
CHLORIDE BLD-SCNC: 104 MMOL/L (ref 98–107)
CO2: 23 MMOL/L (ref 20–31)
CREAT SERPL-MCNC: 0.77 MG/DL (ref 0.7–1.2)
CULTURE: ABNORMAL
DIFFERENTIAL TYPE: ABNORMAL
DIRECT EXAM: ABNORMAL
EOSINOPHILS RELATIVE PERCENT: 4 % (ref 0–4)
GFR AFRICAN AMERICAN: >60 ML/MIN
GFR NON-AFRICAN AMERICAN: >60 ML/MIN
GFR SERPL CREATININE-BSD FRML MDRD: ABNORMAL ML/MIN/{1.73_M2}
GFR SERPL CREATININE-BSD FRML MDRD: ABNORMAL ML/MIN/{1.73_M2}
GLUCOSE BLD-MCNC: 111 MG/DL (ref 70–99)
HCT VFR BLD CALC: 42.7 % (ref 41–53)
HEMOGLOBIN: 13.9 G/DL (ref 13.5–17.5)
IMMATURE GRANULOCYTES: ABNORMAL %
LYMPHOCYTES # BLD: 29 % (ref 24–44)
Lab: ABNORMAL
Lab: ABNORMAL
MCH RBC QN AUTO: 28.8 PG (ref 26–34)
MCHC RBC AUTO-ENTMCNC: 32.5 G/DL (ref 31–37)
MCV RBC AUTO: 88.6 FL (ref 80–100)
MONOCYTES # BLD: 10 % (ref 1–7)
NRBC AUTOMATED: ABNORMAL PER 100 WBC
PDW BLD-RTO: 15.6 % (ref 11.5–14.9)
PLATELET # BLD: 233 K/UL (ref 150–450)
PLATELET ESTIMATE: ABNORMAL
PMV BLD AUTO: 7.3 FL (ref 6–12)
POTASSIUM SERPL-SCNC: 4.3 MMOL/L (ref 3.7–5.3)
RBC # BLD: 4.82 M/UL (ref 4.5–5.9)
RBC # BLD: ABNORMAL 10*6/UL
SEG NEUTROPHILS: 56 % (ref 36–66)
SEGMENTED NEUTROPHILS ABSOLUTE COUNT: 3.3 K/UL (ref 1.3–9.1)
SODIUM BLD-SCNC: 140 MMOL/L (ref 135–144)
SPECIMEN DESCRIPTION: ABNORMAL
SPECIMEN DESCRIPTION: ABNORMAL
TOTAL PROTEIN: 7.5 G/DL (ref 6.4–8.3)
VANCOMYCIN TROUGH DATE LAST DOSE: NORMAL
VANCOMYCIN TROUGH DOSE AMOUNT: NORMAL
VANCOMYCIN TROUGH TIME LAST DOSE: 2039
VANCOMYCIN TROUGH: 16.5 UG/ML (ref 10–20)
WBC # BLD: 5.8 K/UL (ref 3.5–11)
WBC # BLD: ABNORMAL 10*3/UL

## 2020-06-07 PROCEDURE — 6360000002 HC RX W HCPCS: Performed by: EMERGENCY MEDICINE

## 2020-06-07 PROCEDURE — 80202 ASSAY OF VANCOMYCIN: CPT

## 2020-06-07 PROCEDURE — 2580000003 HC RX 258: Performed by: INTERNAL MEDICINE

## 2020-06-07 PROCEDURE — 36415 COLL VENOUS BLD VENIPUNCTURE: CPT

## 2020-06-07 PROCEDURE — 99232 SBSQ HOSP IP/OBS MODERATE 35: CPT | Performed by: NURSE PRACTITIONER

## 2020-06-07 PROCEDURE — 85025 COMPLETE CBC W/AUTO DIFF WBC: CPT

## 2020-06-07 PROCEDURE — 2580000003 HC RX 258: Performed by: EMERGENCY MEDICINE

## 2020-06-07 PROCEDURE — 6370000000 HC RX 637 (ALT 250 FOR IP): Performed by: FAMILY MEDICINE

## 2020-06-07 PROCEDURE — 80053 COMPREHEN METABOLIC PANEL: CPT

## 2020-06-07 PROCEDURE — 6370000000 HC RX 637 (ALT 250 FOR IP): Performed by: NURSE PRACTITIONER

## 2020-06-07 PROCEDURE — 6360000002 HC RX W HCPCS: Performed by: INTERNAL MEDICINE

## 2020-06-07 RX ORDER — POLYMYXIN B SULFATE AND TRIMETHOPRIM 1; 10000 MG/ML; [USP'U]/ML
1 SOLUTION OPHTHALMIC 4 TIMES DAILY
Qty: 1 BOTTLE | Refills: 1 | Status: SHIPPED | OUTPATIENT
Start: 2020-06-07 | End: 2020-06-17

## 2020-06-07 RX ORDER — LINEZOLID 600 MG/1
600 TABLET, FILM COATED ORAL 2 TIMES DAILY
Qty: 14 TABLET | Refills: 0 | Status: SHIPPED | OUTPATIENT
Start: 2020-06-07 | End: 2020-06-14

## 2020-06-07 RX ORDER — TETRAHYDROZOLINE HCL 0.05 %
1 DROPS OPHTHALMIC (EYE) EVERY 4 HOURS PRN
Qty: 10 ML | Refills: 4 | Status: SHIPPED | OUTPATIENT
Start: 2020-06-07 | End: 2020-09-30 | Stop reason: ALTCHOICE

## 2020-06-07 RX ORDER — LINEZOLID 600 MG/1
600 TABLET, FILM COATED ORAL EVERY 12 HOURS SCHEDULED
Status: DISCONTINUED | OUTPATIENT
Start: 2020-06-07 | End: 2020-06-07 | Stop reason: HOSPADM

## 2020-06-07 RX ADMIN — POLYMYXIN B SULFATE, TRIMETHOPRIM SULFATE 1 DROP: 10000; 1 SOLUTION/ DROPS OPHTHALMIC at 07:22

## 2020-06-07 RX ADMIN — VANCOMYCIN HYDROCHLORIDE 1500 MG: 1.5 INJECTION, POWDER, LYOPHILIZED, FOR SOLUTION INTRAVENOUS at 08:10

## 2020-06-07 RX ADMIN — CEFEPIME 2 G: 2 INJECTION, POWDER, FOR SOLUTION INTRAVENOUS at 01:45

## 2020-06-07 RX ADMIN — COLCHICINE 0.6 MG: 0.6 TABLET, FILM COATED ORAL at 07:22

## 2020-06-07 RX ADMIN — POLYMYXIN B SULFATE, TRIMETHOPRIM SULFATE 1 DROP: 10000; 1 SOLUTION/ DROPS OPHTHALMIC at 12:41

## 2020-06-07 RX ADMIN — LINEZOLID 600 MG: 600 TABLET, FILM COATED ORAL at 12:41

## 2020-06-07 ASSESSMENT — ENCOUNTER SYMPTOMS
EYE REDNESS: 0
ALLERGIC/IMMUNOLOGIC NEGATIVE: 1
RESPIRATORY NEGATIVE: 1
EYE DISCHARGE: 0
EYE PAIN: 0
GASTROINTESTINAL NEGATIVE: 1

## 2020-06-07 ASSESSMENT — PAIN SCALES - GENERAL: PAINLEVEL_OUTOF10: 0
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
## 2020-06-07 NOTE — CARE COORDINATION
ONGOING DISCHARGE PLAN:     Spoke with patient regarding discharge plan and patient confirms that plan is still to return home with no needs. Patient declines VNS since he does not need IV antibiotics. Infectious disease discontinued IV vanco and cipro. Patient started on PO zyvox. Patient's out of pocket cost is $25. He said this is affordable. Follow up with Dr. Loni Crystal in 1 week. Possible discharge today.       Marj Seen from [employer] in [place] is following case and can be reached at ph [phone]. AVS faxed to Theo Mueller at [phone].        Will continue to follow for additional discharge needs.

## 2020-06-08 ENCOUNTER — TELEPHONE (OUTPATIENT)
Dept: FAMILY MEDICINE CLINIC | Age: 35
End: 2020-06-08

## 2020-06-08 NOTE — CARE COORDINATION
DISCHARGE PLANNING NOTE:    Received call from pt's external care manager, Teri Loredo, stating they she received d/c summary, however the patient told her he was supposed to be d/c'ed on eye gtt's and was supposed to follow up with an eye doctor in 2 weeks. Teri Loredo states none of this information is in the d/c summary. She asked that writer fax Ophthalmology note with those recommendations and contact info for Dr. Cornel Danielson. Writer faxed this info to [phone].     Electronically signed by Fam Degroot RN on 6/8/2020 at 9:50 AM

## 2020-06-10 LAB
CULTURE: NORMAL
CULTURE: NORMAL
Lab: NORMAL
Lab: NORMAL
SPECIMEN DESCRIPTION: NORMAL
SPECIMEN DESCRIPTION: NORMAL

## 2020-06-23 ENCOUNTER — OFFICE VISIT (OUTPATIENT)
Dept: INFECTIOUS DISEASES | Age: 35
End: 2020-06-23
Payer: COMMERCIAL

## 2020-06-23 ENCOUNTER — TELEPHONE (OUTPATIENT)
Dept: INFECTIOUS DISEASES | Age: 35
End: 2020-06-23

## 2020-06-23 VITALS
HEART RATE: 103 BPM | BODY MASS INDEX: 32.28 KG/M2 | WEIGHT: 213 LBS | HEIGHT: 68 IN | DIASTOLIC BLOOD PRESSURE: 81 MMHG | TEMPERATURE: 98 F | SYSTOLIC BLOOD PRESSURE: 127 MMHG

## 2020-06-23 PROCEDURE — 99213 OFFICE O/P EST LOW 20 MIN: CPT | Performed by: INTERNAL MEDICINE

## 2020-06-23 RX ORDER — DOXYCYCLINE HYCLATE 100 MG/1
100 CAPSULE ORAL 2 TIMES DAILY
Qty: 14 CAPSULE | Refills: 0 | Status: SHIPPED | OUTPATIENT
Start: 2020-06-23 | End: 2020-06-30

## 2020-06-23 NOTE — TELEPHONE ENCOUNTER
Received a notice from [employer] that a PA was needed for this pt doxycycline. I processed it with mahnaz and this is the out come: [place]: Krissy Carrillo - Rx #: [id number] Need help? Call us at [phone]   Outcome   Additional Information Required   Drug is covered by current benefit plan.  No further PA activity needed

## 2020-06-23 NOTE — PROGRESS NOTES
Elastic Bandages & Supports (ACE TENNIS ELBOW BRACE) [facility] Use as directed 1 each 0    Colchicine (MITIGARE) 0.6 MG CAPS Take 1 capsule by mouth daily (Patient not taking: Reported on 9/13/2019) 30 capsule 0     No current facility-administered medications on file prior to visit. Allergies  Allergies   Allergen Reactions    Seasonal         Social   Social History     Tobacco Use    Smoking status: Never Smoker    Smokeless tobacco: Former User   Substance Use Topics    Alcohol use: Yes     Comment: 2 -3 beers a night. sometimes more on wkends               Family History   Problem Relation Age of Onset    High Cholesterol Mother     High Blood Pressure Mother     High Blood Pressure Brother     Cancer Maternal Cousin         bone CA          Review of Systems  No fever / chills / sweats. No weight loss. No visual change, eye pain, eye discharge. No oral lesion, sore throat, dysphagia. Denies cough / sputum. Denies chest pain, palpitations. Denies n / v / abd pain. No diarrhea. Denies dysuria or change in urinary function. Denies joint swelling or pain. Denies focal weakness, Other than above 14systems reviewed were negative . Physical Exam  /81   Pulse 103   Temp 98 °F (36.7 °C)   Ht 5' 8\" (1.727 m)   Wt 213 lb (96.6 kg)   BMI 32.39 kg/m²           General Appearance: alert and oriented to person, place and time, well-developed and well-nourished, in no acute distress  Skin: warm and dry, few erythematous papules on the back. Head: normocephalic and atraumatic  Eyes: pupils equal, round, and reactive to light, extraocular eye movements intact, conjunctivae normal  Neck: neck supple and non tender . Pulmonary/Chest: clear to auscultation bilaterally- no wheezes, rales or rhonchi, normal air movement, no respiratory distress  Cardiovascular: normal rate, regular rhythm, normal S1 and S2, no murmurs.   Abdomen: soft, non-tender, non-distended,  no masses or organomegaly  Extremities: no cyanosis, clubbing or edema  Musculoskeletal: normal range of motion, no joint swelling, deformity or tenderness  Neurologic: no cranial nerve deficit and muscle strength normal    Data Review:    WBC   Date Value Ref Range Status   06/07/2020 5.8 3.5 - 11.0 k/uL Final   06/06/2020 5.4 3.5 - 11.0 k/uL Final   06/05/2020 8.5 3.5 - 11.0 k/uL Final     Hemoglobin   Date Value Ref Range Status   06/07/2020 13.9 13.5 - 17.5 g/dL Final   06/06/2020 13.7 13.5 - 17.5 g/dL Final   06/05/2020 13.3 (L) 13.5 - 17.5 g/dL Final     Hematocrit   Date Value Ref Range Status   06/07/2020 42.7 41 - 53 % Final   06/06/2020 41.5 41 - 53 % Final   06/05/2020 39.7 (L) 41 - 53 % Final     MCV   Date Value Ref Range Status   06/07/2020 88.6 80 - 100 fL Final   06/06/2020 88.5 80 - 100 fL Final   06/05/2020 87.4 80 - 100 fL Final     Platelets   Date Value Ref Range Status   06/07/2020 233 150 - 450 k/uL Final   06/06/2020 202 150 - 450 k/uL Final   06/05/2020 200 150 - 450 k/uL Final     Sodium   Date Value Ref Range Status   06/07/2020 140 135 - 144 mmol/L Final   06/06/2020 139 135 - 144 mmol/L Final   06/05/2020 137 135 - 144 mmol/L Final     Potassium   Date Value Ref Range Status   06/07/2020 4.3 3.7 - 5.3 mmol/L Final   06/06/2020 4.3 3.7 - 5.3 mmol/L Final   06/05/2020 4.0 3.7 - 5.3 mmol/L Final     Chloride   Date Value Ref Range Status   06/07/2020 104 98 - 107 mmol/L Final   06/06/2020 104 98 - 107 mmol/L Final   06/05/2020 103 98 - 107 mmol/L Final     CO2   Date Value Ref Range Status   06/07/2020 23 20 - 31 mmol/L Final   06/06/2020 24 20 - 31 mmol/L Final   06/05/2020 22 20 - 31 mmol/L Final     BUN   Date Value Ref Range Status   06/07/2020 11 6 - 20 mg/dL Final   06/06/2020 10 6 - 20 mg/dL Final   06/05/2020 9 6 - 20 mg/dL Final     CREATININE   Date Value Ref Range Status   06/07/2020 0.77 0.70 - 1.20 mg/dL Final   06/06/2020 0.83 0.70 - 1.20 mg/dL Final   06/05/2020 0.82 0.70 - 1.20 mg/dL

## 2020-08-05 ENCOUNTER — TELEPHONE (OUTPATIENT)
Dept: ORTHOPEDIC SURGERY | Age: 35
End: 2020-08-05
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
## 2020-08-26 ENCOUNTER — OFFICE VISIT (OUTPATIENT)
Dept: ORTHOPEDIC SURGERY | Age: 35
End: 2020-08-26
Payer: COMMERCIAL

## 2020-08-26 PROCEDURE — 99203 OFFICE O/P NEW LOW 30 MIN: CPT | Performed by: ORTHOPAEDIC SURGERY
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
## 2020-09-18 ENCOUNTER — TELEPHONE (OUTPATIENT)
Dept: ORTHOPEDIC SURGERY | Age: 35
End: 2020-09-18

## 2020-09-30 ENCOUNTER — ANESTHESIA EVENT (OUTPATIENT)
Dept: OPERATING ROOM | Age: 35
End: 2020-09-30
Payer: COMMERCIAL

## 2020-09-30 ENCOUNTER — HOSPITAL ENCOUNTER (OUTPATIENT)
Dept: PREADMISSION TESTING | Age: 35
Discharge: HOME | End: 2020-10-04
Payer: COMMERCIAL

## 2020-09-30 VITALS
BODY MASS INDEX: 34.86 KG/M2 | SYSTOLIC BLOOD PRESSURE: 135 MMHG | HEART RATE: 86 BPM | DIASTOLIC BLOOD PRESSURE: 87 MMHG | OXYGEN SATURATION: 100 % | RESPIRATION RATE: 16 BRPM | HEIGHT: 68 IN | WEIGHT: 230 LBS | TEMPERATURE: 97.7 F

## 2020-09-30 LAB
ABSOLUTE EOS #: 0.2 K/UL (ref 0–0.4)
ABSOLUTE IMMATURE GRANULOCYTE: ABNORMAL K/UL (ref 0–0.3)
ABSOLUTE LYMPH #: 1.5 K/UL (ref 1–4.8)
ABSOLUTE MONO #: 0.5 K/UL (ref 0.1–1.3)
ANION GAP SERPL CALCULATED.3IONS-SCNC: 10 MMOL/L (ref 9–17)
BASOPHILS # BLD: 1 % (ref 0–2)
BASOPHILS ABSOLUTE: 0.1 K/UL (ref 0–0.2)
BUN BLDV-MCNC: 16 MG/DL (ref 6–20)
BUN/CREAT BLD: ABNORMAL (ref 9–20)
CALCIUM SERPL-MCNC: 9.5 MG/DL (ref 8.6–10.4)
CHLORIDE BLD-SCNC: 102 MMOL/L (ref 98–107)
CO2: 25 MMOL/L (ref 20–31)
CREAT SERPL-MCNC: 0.8 MG/DL (ref 0.7–1.2)
DIFFERENTIAL TYPE: ABNORMAL
EOSINOPHILS RELATIVE PERCENT: 4 % (ref 0–4)
GFR AFRICAN AMERICAN: >60 ML/MIN
GFR NON-AFRICAN AMERICAN: >60 ML/MIN
GFR SERPL CREATININE-BSD FRML MDRD: ABNORMAL ML/MIN/{1.73_M2}
GFR SERPL CREATININE-BSD FRML MDRD: ABNORMAL ML/MIN/{1.73_M2}
GLUCOSE BLD-MCNC: 115 MG/DL (ref 70–99)
HCT VFR BLD CALC: 44.6 % (ref 41–53)
HEMOGLOBIN: 15.5 G/DL (ref 13.5–17.5)
IMMATURE GRANULOCYTES: ABNORMAL %
LYMPHOCYTES # BLD: 28 % (ref 24–44)
MCH RBC QN AUTO: 31.7 PG (ref 26–34)
MCHC RBC AUTO-ENTMCNC: 34.7 G/DL (ref 31–37)
MCV RBC AUTO: 91.2 FL (ref 80–100)
MONOCYTES # BLD: 10 % (ref 1–7)
NRBC AUTOMATED: ABNORMAL PER 100 WBC
PDW BLD-RTO: 13.2 % (ref 11.5–14.9)
PLATELET # BLD: 219 K/UL (ref 150–450)
PLATELET ESTIMATE: ABNORMAL
PMV BLD AUTO: 7.7 FL (ref 6–12)
POTASSIUM SERPL-SCNC: 4.4 MMOL/L (ref 3.7–5.3)
RBC # BLD: 4.89 M/UL (ref 4.5–5.9)
RBC # BLD: ABNORMAL 10*6/UL
SEG NEUTROPHILS: 57 % (ref 36–66)
SEGMENTED NEUTROPHILS ABSOLUTE COUNT: 3 K/UL (ref 1.3–9.1)
SODIUM BLD-SCNC: 137 MMOL/L (ref 135–144)
WBC # BLD: 5.3 K/UL (ref 3.5–11)
WBC # BLD: ABNORMAL 10*3/UL

## 2020-09-30 PROCEDURE — 93005 ELECTROCARDIOGRAM TRACING: CPT | Performed by: ANESTHESIOLOGY

## 2020-09-30 PROCEDURE — 85025 COMPLETE CBC W/AUTO DIFF WBC: CPT

## 2020-09-30 PROCEDURE — 80048 BASIC METABOLIC PNL TOTAL CA: CPT

## 2020-09-30 PROCEDURE — 36415 COLL VENOUS BLD VENIPUNCTURE: CPT

## 2020-09-30 RX ORDER — OXYCODONE AND ACETAMINOPHEN 7.5; 325 MG/1; MG/1
1 TABLET ORAL EVERY 6 HOURS PRN
COMMUNITY
End: 2022-10-19 | Stop reason: ALTCHOICE

## 2020-09-30 RX ORDER — PREGABALIN 100 MG/1
100 CAPSULE ORAL 3 TIMES DAILY
Status: ON HOLD | COMMUNITY
End: 2020-10-12
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
## 2020-10-01 LAB
EKG ATRIAL RATE: 75 BPM
EKG P AXIS: 45 DEGREES
EKG P-R INTERVAL: 130 MS
EKG Q-T INTERVAL: 368 MS
EKG QRS DURATION: 106 MS
EKG QTC CALCULATION (BAZETT): 410 MS
EKG R AXIS: 9 DEGREES
EKG T AXIS: 3 DEGREES
EKG VENTRICULAR RATE: 75 BPM

## 2020-10-01 RX ORDER — SODIUM CHLORIDE 0.9 % (FLUSH) 0.9 %
10 SYRINGE (ML) INJECTION EVERY 12 HOURS SCHEDULED
Status: CANCELLED | OUTPATIENT
Start: 2020-10-01

## 2020-10-01 RX ORDER — SODIUM CHLORIDE 0.9 % (FLUSH) 0.9 %
10 SYRINGE (ML) INJECTION PRN
Status: CANCELLED | OUTPATIENT
Start: 2020-10-01

## 2020-10-01 RX ORDER — LIDOCAINE HYDROCHLORIDE 10 MG/ML
1 INJECTION, SOLUTION EPIDURAL; INFILTRATION; INTRACAUDAL; PERINEURAL
Status: CANCELLED | OUTPATIENT
Start: 2020-10-01 | End: 2020-10-01

## 2020-10-01 RX ORDER — SODIUM CHLORIDE, SODIUM LACTATE, POTASSIUM CHLORIDE, CALCIUM CHLORIDE 600; 310; 30; 20 MG/100ML; MG/100ML; MG/100ML; MG/100ML
INJECTION, SOLUTION INTRAVENOUS CONTINUOUS
Status: CANCELLED | OUTPATIENT
Start: 2020-10-01

## 2020-10-08 ENCOUNTER — HOSPITAL ENCOUNTER (OUTPATIENT)
Dept: PREADMISSION TESTING | Age: 35
Setting detail: SPECIMEN
Discharge: HOME | End: 2020-10-12
Payer: COMMERCIAL

## 2020-10-08 PROCEDURE — U0003 INFECTIOUS AGENT DETECTION BY NUCLEIC ACID (DNA OR RNA); SEVERE ACUTE RESPIRATORY SYNDROME CORONAVIRUS 2 (SARS-COV-2) (CORONAVIRUS DISEASE [COVID-19]), AMPLIFIED PROBE TECHNIQUE, MAKING USE OF HIGH THROUGHPUT TECHNOLOGIES AS DESCRIBED BY CMS-2020-01-R: HCPCS

## 2020-10-09 LAB — SARS-COV-2, NAA: NOT DETECTED

## 2020-10-12 ENCOUNTER — HOSPITAL ENCOUNTER (OUTPATIENT)
Age: 35
Setting detail: OUTPATIENT SURGERY
Discharge: HOME | End: 2020-10-12
Attending: ORTHOPAEDIC SURGERY | Admitting: ORTHOPAEDIC SURGERY
Payer: COMMERCIAL

## 2020-10-12 ENCOUNTER — ANESTHESIA (OUTPATIENT)
Dept: OPERATING ROOM | Age: 35
End: 2020-10-12
Payer: COMMERCIAL

## 2020-10-12 VITALS
WEIGHT: 230 LBS | OXYGEN SATURATION: 100 % | HEIGHT: 68 IN | HEART RATE: 71 BPM | TEMPERATURE: 96.7 F | BODY MASS INDEX: 34.86 KG/M2 | DIASTOLIC BLOOD PRESSURE: 77 MMHG | SYSTOLIC BLOOD PRESSURE: 121 MMHG | RESPIRATION RATE: 16 BRPM

## 2020-10-12 VITALS — SYSTOLIC BLOOD PRESSURE: 101 MMHG | DIASTOLIC BLOOD PRESSURE: 62 MMHG | TEMPERATURE: 98.6 F | OXYGEN SATURATION: 90 %

## 2020-10-12 PROCEDURE — 6360000002 HC RX W HCPCS: Performed by: ORTHOPAEDIC SURGERY

## 2020-10-12 PROCEDURE — 3600000003 HC SURGERY LEVEL 3 BASE: Performed by: ORTHOPAEDIC SURGERY

## 2020-10-12 PROCEDURE — 7100000001 HC PACU RECOVERY - ADDTL 15 MIN: Performed by: ORTHOPAEDIC SURGERY

## 2020-10-12 PROCEDURE — 29881 ARTHRS KNE SRG MNISECTMY M/L: CPT | Performed by: ORTHOPAEDIC SURGERY

## 2020-10-12 PROCEDURE — 7100000010 HC PHASE II RECOVERY - FIRST 15 MIN: Performed by: ORTHOPAEDIC SURGERY

## 2020-10-12 PROCEDURE — 7100000031 HC ASPR PHASE II RECOVERY - ADDTL 15 MIN: Performed by: ORTHOPAEDIC SURGERY

## 2020-10-12 PROCEDURE — 7100000000 HC PACU RECOVERY - FIRST 15 MIN: Performed by: ORTHOPAEDIC SURGERY

## 2020-10-12 PROCEDURE — 2500000003 HC RX 250 WO HCPCS

## 2020-10-12 PROCEDURE — 7100000011 HC PHASE II RECOVERY - ADDTL 15 MIN: Performed by: ORTHOPAEDIC SURGERY

## 2020-10-12 PROCEDURE — 3600000013 HC SURGERY LEVEL 3 ADDTL 15MIN: Performed by: ORTHOPAEDIC SURGERY

## 2020-10-12 PROCEDURE — 7100000030 HC ASPR PHASE II RECOVERY - FIRST 15 MIN: Performed by: ORTHOPAEDIC SURGERY

## 2020-10-12 PROCEDURE — 3700000000 HC ANESTHESIA ATTENDED CARE: Performed by: ORTHOPAEDIC SURGERY

## 2020-10-12 PROCEDURE — 2580000003 HC RX 258: Performed by: ANESTHESIOLOGY

## 2020-10-12 PROCEDURE — 6360000002 HC RX W HCPCS

## 2020-10-12 PROCEDURE — 3700000001 HC ADD 15 MINUTES (ANESTHESIA): Performed by: ORTHOPAEDIC SURGERY

## 2020-10-12 PROCEDURE — 2709999900 HC NON-CHARGEABLE SUPPLY: Performed by: ORTHOPAEDIC SURGERY

## 2020-10-12 RX ORDER — LIDOCAINE HYDROCHLORIDE 10 MG/ML
INJECTION, SOLUTION EPIDURAL; INFILTRATION; INTRACAUDAL; PERINEURAL PRN
Status: DISCONTINUED | OUTPATIENT
Start: 2020-10-12 | End: 2020-10-12 | Stop reason: SDUPTHER

## 2020-10-12 RX ORDER — FENTANYL CITRATE 50 UG/ML
INJECTION, SOLUTION INTRAMUSCULAR; INTRAVENOUS PRN
Status: DISCONTINUED | OUTPATIENT
Start: 2020-10-12 | End: 2020-10-12 | Stop reason: SDUPTHER

## 2020-10-12 RX ORDER — DIPHENHYDRAMINE HYDROCHLORIDE 50 MG/ML
12.5 INJECTION INTRAMUSCULAR; INTRAVENOUS
Status: DISCONTINUED | OUTPATIENT
Start: 2020-10-12 | End: 2020-10-12 | Stop reason: HOSPADM

## 2020-10-12 RX ORDER — PROPOFOL 10 MG/ML
INJECTION, EMULSION INTRAVENOUS PRN
Status: DISCONTINUED | OUTPATIENT
Start: 2020-10-12 | End: 2020-10-12 | Stop reason: SDUPTHER

## 2020-10-12 RX ORDER — ONDANSETRON 2 MG/ML
INJECTION INTRAMUSCULAR; INTRAVENOUS PRN
Status: DISCONTINUED | OUTPATIENT
Start: 2020-10-12 | End: 2020-10-12 | Stop reason: SDUPTHER

## 2020-10-12 RX ORDER — DEXAMETHASONE SODIUM PHOSPHATE 4 MG/ML
INJECTION, SOLUTION INTRA-ARTICULAR; INTRALESIONAL; INTRAMUSCULAR; INTRAVENOUS; SOFT TISSUE PRN
Status: DISCONTINUED | OUTPATIENT
Start: 2020-10-12 | End: 2020-10-12 | Stop reason: SDUPTHER

## 2020-10-12 RX ORDER — HYDROMORPHONE HCL 110MG/55ML
PATIENT CONTROLLED ANALGESIA SYRINGE INTRAVENOUS PRN
Status: DISCONTINUED | OUTPATIENT
Start: 2020-10-12 | End: 2020-10-12 | Stop reason: SDUPTHER

## 2020-10-12 RX ORDER — MIDAZOLAM HYDROCHLORIDE 1 MG/ML
INJECTION INTRAMUSCULAR; INTRAVENOUS PRN
Status: DISCONTINUED | OUTPATIENT
Start: 2020-10-12 | End: 2020-10-12 | Stop reason: SDUPTHER

## 2020-10-12 RX ORDER — LIDOCAINE HYDROCHLORIDE 10 MG/ML
1 INJECTION, SOLUTION EPIDURAL; INFILTRATION; INTRACAUDAL; PERINEURAL
Status: DISCONTINUED | OUTPATIENT
Start: 2020-10-12 | End: 2020-10-12 | Stop reason: HOSPADM

## 2020-10-12 RX ORDER — SODIUM CHLORIDE 0.9 % (FLUSH) 0.9 %
10 SYRINGE (ML) INJECTION EVERY 12 HOURS SCHEDULED
Status: DISCONTINUED | OUTPATIENT
Start: 2020-10-12 | End: 2020-10-12 | Stop reason: HOSPADM

## 2020-10-12 RX ORDER — ONDANSETRON 2 MG/ML
4 INJECTION INTRAMUSCULAR; INTRAVENOUS
Status: DISCONTINUED | OUTPATIENT
Start: 2020-10-12 | End: 2020-10-12 | Stop reason: HOSPADM

## 2020-10-12 RX ORDER — LABETALOL HYDROCHLORIDE 5 MG/ML
5 INJECTION, SOLUTION INTRAVENOUS EVERY 10 MIN PRN
Status: DISCONTINUED | OUTPATIENT
Start: 2020-10-12 | End: 2020-10-12 | Stop reason: HOSPADM

## 2020-10-12 RX ORDER — ROPIVACAINE HYDROCHLORIDE 5 MG/ML
INJECTION, SOLUTION EPIDURAL; INFILTRATION; PERINEURAL PRN
Status: DISCONTINUED | OUTPATIENT
Start: 2020-10-12 | End: 2020-10-12 | Stop reason: ALTCHOICE

## 2020-10-12 RX ORDER — SODIUM CHLORIDE, SODIUM LACTATE, POTASSIUM CHLORIDE, CALCIUM CHLORIDE 600; 310; 30; 20 MG/100ML; MG/100ML; MG/100ML; MG/100ML
INJECTION, SOLUTION INTRAVENOUS CONTINUOUS
Status: DISCONTINUED | OUTPATIENT
Start: 2020-10-12 | End: 2020-10-12 | Stop reason: HOSPADM

## 2020-10-12 RX ORDER — OXYCODONE HYDROCHLORIDE AND ACETAMINOPHEN 5; 325 MG/1; MG/1
1-2 TABLET ORAL EVERY 6 HOURS PRN
Qty: 30 TABLET | Refills: 0 | Status: SHIPPED | OUTPATIENT
Start: 2020-10-12 | End: 2020-10-19

## 2020-10-12 RX ORDER — MORPHINE SULFATE 2 MG/ML
2 INJECTION, SOLUTION INTRAMUSCULAR; INTRAVENOUS EVERY 5 MIN PRN
Status: DISCONTINUED | OUTPATIENT
Start: 2020-10-12 | End: 2020-10-12 | Stop reason: HOSPADM

## 2020-10-12 RX ORDER — SODIUM CHLORIDE 0.9 % (FLUSH) 0.9 %
10 SYRINGE (ML) INJECTION PRN
Status: DISCONTINUED | OUTPATIENT
Start: 2020-10-12 | End: 2020-10-12 | Stop reason: HOSPADM

## 2020-10-12 RX ORDER — KETOROLAC TROMETHAMINE 30 MG/ML
INJECTION, SOLUTION INTRAMUSCULAR; INTRAVENOUS PRN
Status: DISCONTINUED | OUTPATIENT
Start: 2020-10-12 | End: 2020-10-12 | Stop reason: SDUPTHER

## 2020-10-12 RX ORDER — MEPERIDINE HYDROCHLORIDE 25 MG/ML
12.5 INJECTION INTRAMUSCULAR; INTRAVENOUS; SUBCUTANEOUS EVERY 5 MIN PRN
Status: DISCONTINUED | OUTPATIENT
Start: 2020-10-12 | End: 2020-10-12 | Stop reason: HOSPADM

## 2020-10-12 RX ADMIN — DEXAMETHASONE SODIUM PHOSPHATE 4 MG: 4 INJECTION, SOLUTION INTRA-ARTICULAR; INTRALESIONAL; INTRAMUSCULAR; INTRAVENOUS; SOFT TISSUE at 10:45

## 2020-10-12 RX ADMIN — FENTANYL CITRATE 25 MCG: 50 INJECTION, SOLUTION INTRAMUSCULAR; INTRAVENOUS at 10:45

## 2020-10-12 RX ADMIN — HYDROMORPHONE HYDROCHLORIDE 0.4 MG: 2 INJECTION, SOLUTION INTRAMUSCULAR; INTRAVENOUS; SUBCUTANEOUS at 10:59

## 2020-10-12 RX ADMIN — MIDAZOLAM 2 MG: 1 INJECTION INTRAMUSCULAR; INTRAVENOUS at 10:36

## 2020-10-12 RX ADMIN — SODIUM CHLORIDE, POTASSIUM CHLORIDE, SODIUM LACTATE AND CALCIUM CHLORIDE: 600; 310; 30; 20 INJECTION, SOLUTION INTRAVENOUS at 09:34

## 2020-10-12 RX ADMIN — KETOROLAC TROMETHAMINE 30 MG: 30 INJECTION, SOLUTION INTRAMUSCULAR; INTRAVENOUS at 10:58

## 2020-10-12 RX ADMIN — FENTANYL CITRATE 25 MCG: 50 INJECTION, SOLUTION INTRAMUSCULAR; INTRAVENOUS at 10:42

## 2020-10-12 RX ADMIN — ONDANSETRON 4 MG: 2 INJECTION INTRAMUSCULAR; INTRAVENOUS at 10:57

## 2020-10-12 RX ADMIN — LIDOCAINE HYDROCHLORIDE 50 MG: 10 INJECTION, SOLUTION EPIDURAL; INFILTRATION; INTRACAUDAL; PERINEURAL at 10:36

## 2020-10-12 RX ADMIN — PROPOFOL 200 MG: 10 INJECTION, EMULSION INTRAVENOUS at 10:36

## 2020-10-12 RX ADMIN — FENTANYL CITRATE 50 MCG: 50 INJECTION, SOLUTION INTRAMUSCULAR; INTRAVENOUS at 10:40

## 2020-10-12 ASSESSMENT — PULMONARY FUNCTION TESTS
PIF_VALUE: 18
PIF_VALUE: 18
PIF_VALUE: 2
PIF_VALUE: 18
PIF_VALUE: 3
PIF_VALUE: 8
PIF_VALUE: 15
PIF_VALUE: 18
PIF_VALUE: 1
PIF_VALUE: 0
PIF_VALUE: 18
PIF_VALUE: 18
PIF_VALUE: 19
PIF_VALUE: 18
PIF_VALUE: 0
PIF_VALUE: 18
PIF_VALUE: 14
PIF_VALUE: 18
PIF_VALUE: 1
PIF_VALUE: 18
PIF_VALUE: 1
PIF_VALUE: 18
PIF_VALUE: 1
PIF_VALUE: 21
PIF_VALUE: 4
PIF_VALUE: 0
PIF_VALUE: 18
PIF_VALUE: 18

## 2020-10-12 ASSESSMENT — PAIN SCALES - GENERAL
PAINLEVEL_OUTOF10: 0

## 2020-10-12 ASSESSMENT — ENCOUNTER SYMPTOMS
STRIDOR: 0
SHORTNESS OF BREATH: 0

## 2020-10-12 ASSESSMENT — LIFESTYLE VARIABLES: SMOKING_STATUS: 0

## 2020-10-12 ASSESSMENT — PAIN - FUNCTIONAL ASSESSMENT: PAIN_FUNCTIONAL_ASSESSMENT: 0-10
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
## 2020-10-12 NOTE — INTERVAL H&P NOTE
Update History & Physical    The patient's History and Physical of September 30, 2020 was reviewed with the patient and I examined the patient. There was no change. I concur with findings. Here today for left knee arthroscopy. NPO. No medications taken this am. Denies taking any blood thinners. Denies chest pain/pressure, palpitations, SOB, recent URI, N/V/D or constipation, fever or chills. Review vitals per RN flowsheet.        Electronically signed by JAVIER Pederson CNP on 10/12/2020 at 9:02 AM

## 2020-10-12 NOTE — ANESTHESIA POSTPROCEDURE EVALUATION
POST- ANESTHESIA EVALUATION       Pt Name: Liat Gomez  MRN: [id number]  YOB: 1985  Date of evaluation: 10/12/2020  Time:  12:34 PM      /77   Pulse 71   Temp 96.7 °F (35.9 °C) (Temporal)   Resp 16   Ht 5' 8\" (1.727 m)   Wt 230 lb (104.3 kg)   SpO2 100%   BMI 34.97 kg/m²      Consciousness Level  Awake  Cardiopulmonary Status  Stable  Pain Adequately Treated YES  Nausea / Vomiting  NO  Adequate Hydration  YES  Anesthesia Related Complications NONE      Electronically signed by Jonathan Brody MD on 10/12/2020 at 12:34 PM       Department of Anesthesiology  Postprocedure Note    Patient: Liat Gomez  MRN: [id number]  YOB: 1985  Date of evaluation: 10/12/2020  Time:  12:33 PM     Procedure Summary     Date:  10/12/20 Room / Location:  [address] [place] [address] 03 / [address]  [facility]    Anesthesia Start:  9801 Anesthesia Stop:  1109    Procedure:  KNEE ARTHROSCOPY PARTIAL MEDIAL MENISCECTOMY (Left Knee) Diagnosis:  (LEFT KNEE MEDIAL MENISCUS TEAR)    Surgeon:  Elizabeth Lau MD Responsible Provider:  Jonathan Brody MD    Anesthesia Type:  general ASA Status:  2          Anesthesia Type: general    Hiral Phase I: Hiral Score: 10    Hiral Phase II: Hiral Score: 10    Last vitals: Reviewed and per EMR flowsheets.        Anesthesia Post Evaluation

## 2020-10-19 ENCOUNTER — OFFICE VISIT (OUTPATIENT)
Dept: ORTHOPEDIC SURGERY | Age: 35
End: 2020-10-19

## 2020-10-19 PROCEDURE — 99024 POSTOP FOLLOW-UP VISIT: CPT | Performed by: ORTHOPAEDIC SURGERY

## 2020-10-19 NOTE — PROGRESS NOTES
Patient returns today status post left knee arthroscopy with partial (medial/lateral) meniscectomy. Patient has no major complaints other than expected tightness/swelling with ROM. Sharp/stabbing pain has improved. On exam, portal sites are without redness or drainage. No calf tenderness; negative Norman's sign. Motion is 0-100 degrees. No significant effusion. Assessment  Status post left knee arthroscopy    Plan  Patient given exercises to perform. Patient given activities/ motions to complete. Continue activities at home. Return to work. RTO PRN. Call with any future problems.

## 2020-11-03 NOTE — ED NOTES
Mode of arrival (squad #, walk in, police, etc) : walk in        Chief complaint(s): facial swelling        Arrival Note (brief scenario, treatment PTA, etc). : Patient reports that two days he woke up with swelling around his right eye, he states that he went to an urgent care yesterday and was started two antibiotics. Patient states that he has been taking the prescribed antibiotics but reports that the swelling has gotten significantly worse. Patient reports that he had a fever last night and took OTC Tylenol. Patient states he noticed he had a fever this AM but did not take any medication. Patient alert and oriented x4, respirations even non-labored. He denies any cough, SOB, nausea, vomiting, or diarrhea.           Yasmani Junior RN  06/04/20 6592
No

## 2020-11-06 ENCOUNTER — HOSPITAL ENCOUNTER (OUTPATIENT)
Dept: PHYSICAL THERAPY | Age: 35
Setting detail: THERAPIES SERIES
Discharge: HOME | End: 2020-11-06
Payer: COMMERCIAL

## 2020-11-06 PROCEDURE — 97545 WORK HARDENING: CPT

## 2020-11-09 ENCOUNTER — HOSPITAL ENCOUNTER (OUTPATIENT)
Dept: PHYSICAL THERAPY | Age: 35
Setting detail: THERAPIES SERIES
Discharge: HOME | End: 2020-11-09
Payer: COMMERCIAL

## 2020-11-09 PROCEDURE — 97545 WORK HARDENING: CPT

## 2020-11-09 PROCEDURE — 97546 WORK HARDENING ADD-ON: CPT

## 2020-11-10 ENCOUNTER — HOSPITAL ENCOUNTER (OUTPATIENT)
Dept: PHYSICAL THERAPY | Age: 35
Setting detail: THERAPIES SERIES
Discharge: HOME | End: 2020-11-10
Payer: COMMERCIAL

## 2020-11-10 PROCEDURE — 97546 WORK HARDENING ADD-ON: CPT

## 2020-11-10 PROCEDURE — 97545 WORK HARDENING: CPT

## 2020-11-11 ENCOUNTER — HOSPITAL ENCOUNTER (OUTPATIENT)
Dept: PHYSICAL THERAPY | Age: 35
Setting detail: THERAPIES SERIES
Discharge: HOME | End: 2020-11-11
Payer: COMMERCIAL

## 2020-11-11 PROCEDURE — 97545 WORK HARDENING: CPT

## 2020-11-11 PROCEDURE — 97546 WORK HARDENING ADD-ON: CPT

## 2020-11-12 ENCOUNTER — HOSPITAL ENCOUNTER (OUTPATIENT)
Dept: PHYSICAL THERAPY | Age: 35
Setting detail: THERAPIES SERIES
Discharge: HOME | End: 2020-11-12
Payer: COMMERCIAL

## 2020-11-12 PROCEDURE — 97545 WORK HARDENING: CPT
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
## 2020-11-23 ENCOUNTER — HOSPITAL ENCOUNTER (OUTPATIENT)
Dept: PHYSICAL THERAPY | Age: 35
Setting detail: THERAPIES SERIES
Discharge: HOME | End: 2020-11-23
Payer: COMMERCIAL

## 2020-11-23 PROCEDURE — 97545 WORK HARDENING: CPT

## 2020-11-23 PROCEDURE — 97546 WORK HARDENING ADD-ON: CPT

## 2020-11-24 ENCOUNTER — HOSPITAL ENCOUNTER (OUTPATIENT)
Dept: PHYSICAL THERAPY | Age: 35
Setting detail: THERAPIES SERIES
Discharge: HOME | End: 2020-11-24
Payer: COMMERCIAL

## 2020-11-24 PROCEDURE — 97545 WORK HARDENING: CPT

## 2020-11-24 PROCEDURE — 97546 WORK HARDENING ADD-ON: CPT

## 2020-11-25 ENCOUNTER — HOSPITAL ENCOUNTER (OUTPATIENT)
Dept: PHYSICAL THERAPY | Age: 35
Setting detail: THERAPIES SERIES
Discharge: HOME | End: 2020-11-25
Payer: COMMERCIAL

## 2020-11-25 PROCEDURE — 97546 WORK HARDENING ADD-ON: CPT

## 2020-11-25 PROCEDURE — 97545 WORK HARDENING: CPT
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
## 2020-12-16 ENCOUNTER — OFFICE VISIT (OUTPATIENT)
Dept: ORTHOPEDIC SURGERY | Age: 35
End: 2020-12-16

## 2020-12-16 PROCEDURE — 99024 POSTOP FOLLOW-UP VISIT: CPT | Performed by: ORTHOPAEDIC SURGERY

## 2020-12-16 RX ORDER — MELOXICAM 15 MG/1
15 TABLET ORAL DAILY
Qty: 30 TABLET | Refills: 3 | Status: SHIPPED | OUTPATIENT
Start: 2020-12-16 | End: 2022-10-19 | Stop reason: ALTCHOICE
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
## 2020-12-30 NOTE — PROGRESS NOTES
[facility]             [address]             Phone: [phone]             Fax: [phone]     Physical Therapy Daily Treatment Note     Date:  11/24/2020  Patient Normamarifer Gold  Physician[facility] Dawood Aguilar[facility] approved for work reconditioning  Medical Diagnosis: (L) knee pain, meniscus repair                Rehab Codes: knee pain difficulty walking  Onset date: 2/20/20; sx 10/12                Next Dr's appt.: 11/30/20  Visit# / total visits: 7/12               Cancels/No Shows: 0/0     Subjective:  Patient sore after and medial joint discomfort during exercises last treatment without increased pain in the medial joint. Frustrated as he says \"its not getting better but not getting worse\"  Reviewed increased tolerance of activity although still sore, able to do more without increased pain levels. Pain:  [x]? ???? Yes  []????? No Location: (L) medial knee   Pain Rating: (0-10 scale) 4/10  Pain altered Tx:  []????? Yes  [x]? ???? No  Action:  Symptoms:  []????? Improving   []????? Worsening           []????? Same  Comments:     Objective:  Modalities:   Precautions:  Exercises:  Exercise Reps/ Time Weight/ Level Comments   NUSTEP 10'       BIKE 10'x 2       Standing exercises  25' x 4   Squats 2 x 20, marches 20x, heel raise 20x, 3 way hip 20x (B), standing calf stretch 3 x 30\", step up 8\" F/L 20x   Carry/Cart Margarita Transfer 15' x 2   Hallway 6 x 76' with margairta/35# crate; carry 6 x 76' with 35# crate; lifting carrying 25# crate floor to waist and waist to shoulder level 10x   Stairs/ladders 10' x 1       KNEELING peg board activity 5' x 1   VALPAR board, screwing/unscrewing bolts   Supine stretching program  10'   2nd hour; supine calf stretch 3 x 30\"; supine hamstring stretch 3 x 30\", TFL supine stretch 3 x 30\", prone quad stretch 3 x 30\"   TREADMILL 10' x 2       Other:     Other: 1 hour blocks with 5' break at end of each hour; 4th set of standing exercises in 4th hour     Specific Instructions for next treatment:        Assessment:  Worked on exercises per flow sheet, working on simulation of work activity and reconditioning with stairs, ladders, kneeling, lifting/carrying as well as general LE warm up and strengthening exercises.               [x]? ??? Progressing toward goals.                       []???? No change.                        []???? Other:                        [x]? ??? Patient would continue to benefit from skilled physical therapy services in order to: facilitate/simulate safe return to work. STG/LTG : (to be met in 12 treatments)  1. ? Pain: Improved pain to 1-2/10 at worst with increased activity. 2. ? ROM: 135 degrees knee flexion  3. ? Strength: Squats to 90 degrees without pain  4. ? Function: Tolerance of work simulated activities as listed above. 5. Independent with Home Exercise Programs     Pt. Education:  [x]? ??? Yes  []???? No  [x]? ??? Reviewed Prior HEP/Ed  Method of Education: [x]???? Verbal  []???? Demo  []???? Written  Comprehension of Education:  [x]???? Verbalizes understanding.  []???? Demonstrates understanding.  []???? Needs review.  []???? Demonstrates/verbalizes HEP/Ed previously given.      Plan:   [x]? ??? Continue per plan of care.             []???? Other:                          Treatment Charges: Mins Units   []????  Modalities       []????  Ther Exercise       []????  Manual Therapy       []????  Ther Activities       []????  Aquatics       []????  Neuromuscular       []???? Vasocompression       []???? Gait Training       []???? Dry needling        []???? 1 or 2 muscles        []???? 3 or more muscles       [x]? ???  Other Work reconditioning  205'     Total Treatment time 205'        Time In:0800            Time [facility]: 9941     Electronically signed by:  Mickey Milesr, PT

## 2020-12-30 NOTE — PROGRESS NOTES
[address] [facility]   [address]   Phone: [phone]   Fax: [phone]    Physical Therapy Daily Treatment Note    Date:  2020  Patient Name:  Sage Bryant    :  1985  MRN: [id number]  Physician: Chad Aguilar [facility]: Graham Mittal [facility] 12 visits approved for work reconditioning  Medical Diagnosis: (L) knee pain, meniscus repair                Rehab Codes: knee pain difficulty walking  Onset date: 20; sx 10/12                Next Dr's appt.: 20  Visit# / total visits:   Cancels/No Shows: 0/0    Subjective:  Patient with mild increase in medial joint line pain following initial therapy visit. Working on work reconditioning program- multiple sets/reps of repeated work with MARGARITA, lifting/carrying, and knee strengthening program to simulate work activity. Still notes joint line soreness with stairs and full squatting. Pain:  [x]? Yes  []? No Location: (L) medial knee   Pain Rating: (0-10 scale) 410  Pain altered Tx:  []? Yes  [x]? No  Action:  Symptoms:  []? Improving   []? Worsening           []?  Same  Comments:    Objective:  Modalities:   Precautions:  Exercises:  Exercise Reps/ Time Weight/ Level Comments   NUSTEP 10'       BIKE 10'        Standing exercises  25' x 3   Squats 2 x 20, marches 20x, heel raise 20x, 3 way hip 20x (B), standing calf stretch 3 x 30\", step up 8\" F/L 20x   Carry/Cart Margarita Transfer 10' x 3   Hallway 6 x 76' with margarita/35# crate; carry 6 x 76' with 35# crate; lifting carrying 25# crate floor to waist and waist to shoulder level 10x   Supine stretching program  10'  2nd hour; supine calf stretch 3 x 30\"; supine hamstring stretch 3 x 30\", TFL supine stretch 3 x 30\", prone quad stretch 3 x 30\"   TREADMILL 10'       Other:    Other: 1 hour blocks with 5' break at end of each hour; 2 hours and 45 minutes total today    Specific Instructions for next treatment:      Assessment:  Added stairs with standing/regular exercise program.  Added supine stretching program at second hour secondary to complaints of calf and posterior knee discomfort. Added 3rd set of standing exercises and dre/carrying simulation             [x] Progressing toward goals. [] No change. [] Other:    [x] Patient would continue to benefit from skilled physical therapy services in order to: facilitate/simulate safe return to work. STG/LTG : (to be met in 12 treatments)  1. ? Pain: Improved pain to 1-2/10 at worst with increased activity. 2. ? ROM: 135 degrees knee flexion  3. ? Strength: Squats to 90 degrees without pain  4. ? Function: Tolerance of work simulated activities as listed above. 5. Independent with Home Exercise Programs    Pt. Education:  [x] Yes  [] No  [x] Reviewed Prior HEP/Ed  Method of Education: [x] Verbal  [] Demo  [] Written  Comprehension of Education:  [x] Verbalizes understanding. [] Demonstrates understanding. [] Needs review. [] Demonstrates/verbalizes HEP/Ed previously given. Plan: [x] Continue per plan of care.    [] Other:      Treatment Charges: Mins Units   []  Modalities     []  Ther Exercise     []  Manual Therapy     []  Ther Activities     []  Aquatics     []  Neuromuscular     [] Vasocompression     [] Gait Training     [] Dry needling        [] 1 or 2 muscles        [] 3 or more muscles     [x]  Other Work reconditioning  165'    Total Treatment time 165'      Time In:0900            Time Out: 1950    Electronically signed by:  Latha Barboza, PT
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
## 2020-12-30 NOTE — PROGRESS NOTES
end of each hour; 2 hours and 45 minutes total today     Specific Instructions for next treatment:        Assessment:  Worked on exercises per flow sheet, added stairs and ladders with patients without increased pain.                [x]? ? Progressing toward goals.                       []? ? No change.                        []? ? Other:                        [x]? ? Patient would continue to benefit from skilled physical therapy services in order to: facilitate/simulate safe return to work. STG/LTG : (to be met in 12 treatments)  1. ? Pain: Improved pain to 1-2/10 at worst with increased activity. 2. ? ROM: 135 degrees knee flexion  3. ? Strength: Squats to 90 degrees without pain  4. ? Function: Tolerance of work simulated activities as listed above. 5. Independent with Home Exercise Programs     Pt. Education:  [x]? ? Yes  []? ? No  [x]? ? Reviewed Prior HEP/Ed  Method of Education: [x]? ? Verbal  []? ? Demo  []? ? Written  Comprehension of Education:  [x]? ? Verbalizes understanding.  []?? Demonstrates understanding.  []?? Needs review. []?? Demonstrates/verbalizes HEP/Ed previously given.      Plan:   [x]? ? Continue per plan of care.             []? ? Other:                          Treatment Charges: Mins Units   []? ?  Modalities       []? ?  Ther Exercise       []? ?  Manual Therapy       []? ?  Ther Activities       []? ?  Aquatics       []? ?  Neuromuscular       []? ? Vasocompression       []? ? Gait Training       []? ? Dry needling        []? ? 1 or 2 muscles        []? ? 3 or more muscles       [x]? ?  Other Work reconditioning  165'     Total Treatment time [address]  Time In:0900            Time ANAND: 5433     Electronically signed by:  Mickey Robertson

## 2020-12-30 NOTE — PROGRESS NOTES
[address] [facility]             [address]             Phone: [phone]             Fax: [phone]     Physical Therapy Daily Treatment Note     Date:  11/10/2020  Patient Name:  Meera Rudolph                       :  1985                 MRN: [id number]  Fidelina Chungme: [address] [facility] 12 visits approved for work reconditioning  Medical Diagnosis: (L) knee pain, meniscus repair                Rehab Codes: knee pain difficulty walking  Onset date: 20; sx 10/12                Next 's appt.: 20  Visit# / total visits: 3/               Cancels/No Shows: 0/0     Subjective:  Patient knee feeling \"OK\" with added exercises last treatment session. Patient stated still getting medial knee pain and posterior medial knee pain with full squatting ADLs. Pain:  [x]? ? Yes  []? ? No Location: (L) medial knee   Pain Rating: (0-10 scale) 4/10  Pain altered Tx:  []? ? Yes  [x]? ? No  Action:  Symptoms:  []?? Improving   []? ? Worsening           []? ? Same  Comments:     Objective:  Modalities:   Precautions:  Exercises:  Exercise Reps/ Time Weight/ Level Comments   NUSTEP 10'       BIKE 10'x 2        Standing exercises  25' x 3   Squats 2 x 20, marches 20x, heel raise 20x, 3 way hip 20x (B), standing calf stretch 3 x 30\", step up 8\" F/L 20x   Carry/Cart Margarita Transfer 10' x 2   Hallway 6 x 76' with margarita/35# crate; carry 6 x 76' with 35# crate; lifting carrying 25# crate floor to waist and waist to shoulder level 10x   KNEELING peg board activity 10' x 1  VALPAR board, screwing/unscrewing bolts   Supine stretching program  10'   2nd hour; supine calf stretch 3 x 30\"; supine hamstring stretch 3 x 30\", TFL supine stretch 3 x 30\", prone quad stretch 3 x 30\"   TREADMILL 10'       Other:     Other: 1 hour blocks with 5' break at end of each hour; 2 hours and 45 minutes total today     Specific Instructions for next treatment:        Assessment:  Worked on exercises per flow sheet, added kneeling with screwing/unscrewing bolts in place of one of the dre exercises today to further simulate and kneeling/bending activities. [x]? Progressing toward goals. []? No change. []? Other:                        [x]? Patient would continue to benefit from skilled physical therapy services in order to: facilitate/simulate safe return to work. STG/LTG : (to be met in 12 treatments)  1. ? Pain: Improved pain to 1-2/10 at worst with increased activity. 2. ? ROM: 135 degrees knee flexion  3. ? Strength: Squats to 90 degrees without pain  4. ? Function: Tolerance of work simulated activities as listed above. 5. Independent with Home Exercise Programs     Pt. Education:  [x]? Yes  []? No  [x]? Reviewed Prior HEP/Ed  Method of Education: [x]? Verbal  []? Demo  []? Written  Comprehension of Education:  [x]? Verbalizes understanding. []? Demonstrates understanding. []? Needs review. []? Demonstrates/verbalizes HEP/Ed previously given.      Plan:   [x]? Continue per plan of care. []? Other:                          Treatment Charges: Mins Units   []? Modalities       []? Ther Exercise       []? Manual Therapy       []? Ther Activities       []? Aquatics       []? Neuromuscular       []? Vasocompression       []? Gait Training       []? Dry needling        []? 1 or 2 muscles        []? 3 or more muscles       [x]?   Other Work reconditioning  165'     Total Treatment time 165'        Time In:0900            Time Out: 1145     Electronically signed by:  Dg Bergeron PT

## 2020-12-30 NOTE — PROGRESS NOTES
[facility]             [address]             Phone: [phone]             Fax: [phone]     Physical Therapy Daily Treatment Note     Date:  11/12/2020  Patient Myra Danger  Physician: Caitlin Aguilar: [employer] [place] [address] [facility] 12 visits approved for work reconditioning  Medical Diagnosis: (L) knee pain, meniscus repair                Rehab Codes: knee pain difficulty walking  Onset date: 2/20/20; sx 10/12                Next Dr's appt.: 11/30/20  Visit# / total visits: 5/12               Cancels/No Shows: 0/0     Subjective:  Patient still noting generalized knee pain during squats, with squatting too far, with kneeling activity during activity in the clinic, not significantly worse afterwards. Patient to decrease amount of squat to remedy this in the clinic. Pain:  [x]? ??? Yes  []???? No Location: (L) medial knee   Pain Rating: (0-10 scale) 4/10  Pain altered Tx:  []???? Yes  [x]? ??? No  Action:  Symptoms:  []???? Improving   []???? Worsening           []???? Same  Comments:     Objective:  Modalities:   Precautions:  Exercises:  Exercise Reps/ Time Weight/ Level Comments   NUSTEP 10'       BIKE 10'x 1        Standing exercises  20' x 2   Squats 2 x 20, marches 20x, heel raise 20x, 3 way hip 20x (B), standing calf stretch 3 x 30\", step up 8\" F/L 20x   Carry/Cart Margarita Transfer 15' x 2   Hallway 6 x 76' with margarita/35# crate; carry 6 x 76' with 35# crate; lifting carrying 25# crate floor to waist and waist to shoulder level 15x   Stairs/ladders 10' x 1       KNEELING peg board activity 5' x 1   VALPAR board, screwing/unscrewing bolts   Supine stretching program  10'   2nd hour; supine calf stretch 3 x 30\"; supine hamstring stretch 3 x 30\", TFL supine stretch 3 x 30\", prone quad stretch 3 x 30\"   TREADMILL 10' x 2   Other:     Other: 1 hour blocks with 5' break at end of each hour; held 3rd set of standing exercises today as patient had time constraints and needed to leave early     Specific Instructions for next treatment:        Assessment:  Worked on exercises per flow sheet, patient had to leave early- going to [place] today and will be out of town for remainder of this week and for beginning of next week at least.                 [x]? ?? Progressing toward goals.                       []? ?? No change.                        []? ?? Other:                        [x]? ?? Patient would continue to benefit from skilled physical therapy services in order to: facilitate/simulate safe return to work. STG/LTG : (to be met in 12 treatments)  1. ? Pain: Improved pain to 1-2/10 at worst with increased activity. 2. ? ROM: 135 degrees knee flexion  3. ? Strength: Squats to 90 degrees without pain  4. ? Function: Tolerance of work simulated activities as listed above. 5. Independent with Home Exercise Programs     Pt. Education:  [x]? ?? Yes  []??? No  [x]? ?? Reviewed Prior HEP/Ed  Method of Education: [x]??? Verbal  []??? Demo  []??? Written  Comprehension of Education:  [x]??? Verbalizes understanding.  []??? Demonstrates understanding.  []??? Needs review.  []??? Demonstrates/verbalizes HEP/Ed previously given.      Plan:   [x]? ?? Continue per plan of care.             []? ?? Other:                          Treatment Charges: Mins Units   []? ??  Modalities       []? ??  Ther Exercise       []? ??  Manual Therapy       []? ??  Ther Activities       []? ??  Aquatics       []???  Neuromuscular       []??? Vasocompression       []??? Gait Training       []??? Dry needling        []??? 1 or 2 muscles        []??? 3 or more muscles       [x]? ??  Other Work reconditioning  145'     Total Treatment time [address]  Time In:0800            Time PHILLIP: [id number]     Electronically signed by:  Mickey Andujar

## 2021-06-23 ENCOUNTER — TELEPHONE (OUTPATIENT)
Dept: FAMILY MEDICINE CLINIC | Age: 36
End: 2021-06-23
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
## 2021-06-23 NOTE — TELEPHONE ENCOUNTER
Left a message for  Zander Oneil who is out of the office until next week that we would be able to follow the patient for NON worker's comp issues but we are not worker's comp providers and would not be able to follow on WC issues. I gave her the private line number to call Tl Javed next week if she had any further questions.

## 2022-08-08 ENCOUNTER — INITIAL CONSULT (OUTPATIENT)
Dept: PHYSICAL MEDICINE AND REHAB | Age: 37
End: 2022-08-08
Payer: COMMERCIAL

## 2022-08-08 VITALS
TEMPERATURE: 97.9 F | SYSTOLIC BLOOD PRESSURE: 140 MMHG | BODY MASS INDEX: 37.1 KG/M2 | HEART RATE: 110 BPM | WEIGHT: 244 LBS | DIASTOLIC BLOOD PRESSURE: 98 MMHG

## 2022-08-08 DIAGNOSIS — M70.62 GREATER TROCHANTERIC BURSITIS OF LEFT HIP: Primary | ICD-10-CM

## 2022-08-08 DIAGNOSIS — T31.20 BURN (ANY DEGREE) INVOLVING 20-29% OF BODY SURFACE: ICD-10-CM

## 2022-08-08 DIAGNOSIS — M25.462 EFFUSION OF LEFT KNEE: ICD-10-CM

## 2022-08-08 PROCEDURE — 99204 OFFICE O/P NEW MOD 45 MIN: CPT | Performed by: PHYSICAL MEDICINE & REHABILITATION

## 2022-08-08 RX ORDER — AMITRIPTYLINE HYDROCHLORIDE 25 MG/1
25 TABLET, FILM COATED ORAL NIGHTLY
Qty: 30 TABLET | Refills: 5 | Status: SHIPPED | OUTPATIENT
Start: 2022-08-08
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
## 2022-09-21 DIAGNOSIS — M70.62 GREATER TROCHANTERIC BURSITIS OF LEFT HIP: ICD-10-CM

## 2022-09-21 DIAGNOSIS — M25.462 EFFUSION OF LEFT KNEE: ICD-10-CM

## 2022-09-30 ENCOUNTER — HOSPITAL ENCOUNTER (OUTPATIENT)
Dept: GENERAL RADIOLOGY | Age: 37
Discharge: HOME | End: 2022-10-02
Payer: COMMERCIAL

## 2022-09-30 ENCOUNTER — HOSPITAL ENCOUNTER (OUTPATIENT)
Age: 37
Discharge: HOME | End: 2022-10-02

## 2022-09-30 PROCEDURE — 73562 X-RAY EXAM OF KNEE 3: CPT

## 2022-09-30 PROCEDURE — 73502 X-RAY EXAM HIP UNI 2-3 VIEWS: CPT

## 2022-10-17 ENCOUNTER — OFFICE VISIT (OUTPATIENT)
Dept: PHYSICAL MEDICINE AND REHAB | Age: 37
End: 2022-10-17
Payer: COMMERCIAL

## 2022-10-17 VITALS
TEMPERATURE: 99 F | SYSTOLIC BLOOD PRESSURE: 123 MMHG | HEART RATE: 116 BPM | HEIGHT: 68 IN | BODY MASS INDEX: 38.16 KG/M2 | DIASTOLIC BLOOD PRESSURE: 90 MMHG | WEIGHT: 251.8 LBS

## 2022-10-17 DIAGNOSIS — T31.20 BURN (ANY DEGREE) INVOLVING 20-29% OF BODY SURFACE: Primary | ICD-10-CM

## 2022-10-17 DIAGNOSIS — M70.62 GREATER TROCHANTERIC BURSITIS OF LEFT HIP: ICD-10-CM

## 2022-10-17 PROCEDURE — 99214 OFFICE O/P EST MOD 30 MIN: CPT | Performed by: PHYSICAL MEDICINE & REHABILITATION

## 2022-10-17 RX ORDER — TRAMADOL HYDROCHLORIDE 50 MG/1
50 TABLET ORAL EVERY 6 HOURS PRN
COMMUNITY
End: 2022-10-19 | Stop reason: ALTCHOICE

## 2022-10-17 RX ORDER — TIZANIDINE 4 MG/1
4 TABLET ORAL NIGHTLY PRN
Qty: 30 TABLET | Refills: 2 | Status: SHIPPED | OUTPATIENT
Start: 2022-10-17 | End: 2022-10-19 | Stop reason: ALTCHOICE
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
## 2022-10-18 ENCOUNTER — OFFICE VISIT (OUTPATIENT)
Dept: FAMILY MEDICINE CLINIC | Age: 37
End: 2022-10-18

## 2022-10-18 VITALS
HEIGHT: 68 IN | BODY MASS INDEX: 37.89 KG/M2 | HEART RATE: 103 BPM | SYSTOLIC BLOOD PRESSURE: 143 MMHG | OXYGEN SATURATION: 98 % | WEIGHT: 250 LBS | DIASTOLIC BLOOD PRESSURE: 99 MMHG | TEMPERATURE: 98 F

## 2022-10-18 DIAGNOSIS — B96.89 ACUTE BACTERIAL SINUSITIS: Primary | ICD-10-CM

## 2022-10-18 DIAGNOSIS — R05.1 ACUTE COUGH: ICD-10-CM

## 2022-10-18 DIAGNOSIS — J01.90 ACUTE BACTERIAL SINUSITIS: Primary | ICD-10-CM

## 2022-10-18 DIAGNOSIS — R03.0 ELEVATED BLOOD PRESSURE READING: ICD-10-CM

## 2022-10-18 PROCEDURE — 99203 OFFICE O/P NEW LOW 30 MIN: CPT

## 2022-10-18 RX ORDER — PREDNISONE 20 MG/1
40 TABLET ORAL DAILY
Qty: 10 TABLET | Refills: 0 | Status: SHIPPED | OUTPATIENT
Start: 2022-10-18 | End: 2022-10-23

## 2022-10-18 RX ORDER — AMOXICILLIN AND CLAVULANATE POTASSIUM 875; 125 MG/1; MG/1
1 TABLET, FILM COATED ORAL 2 TIMES DAILY
Qty: 14 TABLET | Refills: 0 | Status: SHIPPED | OUTPATIENT
Start: 2022-10-18 | End: 2022-10-25

## 2022-10-18 RX ORDER — BENZONATATE 200 MG/1
200 CAPSULE ORAL 3 TIMES DAILY PRN
Qty: 21 CAPSULE | Refills: 0 | Status: SHIPPED | OUTPATIENT
Start: 2022-10-18 | End: 2022-10-25

## 2022-10-18 ASSESSMENT — ENCOUNTER SYMPTOMS
SINUS PRESSURE: 1
EYE ITCHING: 0
SORE THROAT: 0
TROUBLE SWALLOWING: 0
NAUSEA: 0
EYE REDNESS: 0
BACK PAIN: 0
GASTROINTESTINAL NEGATIVE: 1
WHEEZING: 0
SINUS PAIN: 0
FACIAL SWELLING: 0
HEMOPTYSIS: 0
SHORTNESS OF BREATH: 1
PHOTOPHOBIA: 0
VOICE CHANGE: 0
EYE DISCHARGE: 0
STRIDOR: 0
RECTAL PAIN: 0
EYE PAIN: 0
CHOKING: 0
APNEA: 0
EYES NEGATIVE: 1
CHEST TIGHTNESS: 0
ABDOMINAL PAIN: 0
RHINORRHEA: 1
ANAL BLEEDING: 0
CONSTIPATION: 0
DIARRHEA: 0
VOMITING: 0
COLOR CHANGE: 0
ABDOMINAL DISTENTION: 0
BLOOD IN STOOL: 0
COUGH: 1
HEARTBURN: 0

## 2022-10-18 NOTE — PROGRESS NOTES
[facility] WALK-IN FAMILY MEDICINE  [address] [facility],[address]  [place] WALK-IN FAMILY MEDICINE  [address] [facility] [address]  Dept: [phone]    Bob Lloyd is a 40 y.o. male New patient, who presents to the walk-in clinic today with conditions/complaints as noted below:    Chief Complaint   Patient presents with    Cough     Patient is here c/o a cough that has been going on for 3 weeks and he had a burning pain on the right side of his chest. The pain comes and goes. HPI:     Cough  This is a new problem. Episode onset: 3 weeks ago. The problem has been gradually worsening. The problem occurs constantly. The cough is Productive of sputum. Associated symptoms include nasal congestion, rhinorrhea and shortness of breath. Pertinent negatives include no chest pain, chills, ear congestion, ear pain, eye redness, fever, headaches, heartburn, hemoptysis, myalgias, postnasal drip, rash, sore throat, sweats, weight loss or wheezing. Treatments tried: Nyquil, Mucinex, CLaritin. The treatment provided no relief. Declines COVID testing. At home COVID negative. Past Medical History:   Diagnosis Date    History of blood transfusion     Feb 2020    Hypertension     Hx of controlled with weight loss and diet. Did not take meds. Hypertriglyceridemia 5/19/2017    Obesity (BMI 30.0-34.9) 5/19/2017    Thyroid disease     Hx of surgery on Thyroid when 16-14 years old.         Current Outpatient Medications   Medication Sig Dispense Refill    amoxicillin-clavulanate (AUGMENTIN) 875-125 MG per tablet Take 1 tablet by mouth 2 times daily for 7 days 14 tablet 0    benzonatate (TESSALON) 200 MG capsule Take 1 capsule by mouth 3 times daily as needed for Cough 21 capsule 0    predniSONE (DELTASONE) 20 MG tablet Take 2 tablets by mouth daily for 5 days 10 tablet 0    amitriptyline (ELAVIL) 25 MG tablet Take 1 tablet by mouth nightly 30 tablet 5    loratadine (CLARITIN) 10 MG tablet Take 1 tablet by mouth daily 30 tablet 3    traMADol (ULTRAM) 50 MG tablet Take 50 mg by mouth every 6 hours as needed for Pain. (Patient not taking: Reported on 10/18/2022)      tiZANidine (ZANAFLEX) 4 MG tablet Take 1 tablet by mouth nightly as needed (muscle spasms) (Patient not taking: Reported on 10/18/2022) 30 tablet 2    meloxicam (MOBIC) 15 MG tablet Take 1 tablet by mouth daily (Patient not taking: No sig reported) 30 tablet 3    oxyCODONE-acetaminophen (PERCOCET) 7.5-325 MG per tablet Take 1 tablet by mouth every 6 hours as needed for Pain (1 tab in morning and .5 tab in evening). (Patient not taking: No sig reported)       No current facility-administered medications for this visit. Allergies   Allergen Reactions    Other     Seasonal        Review of Systems:     Review of Systems   Constitutional: Negative. Negative for activity change, appetite change, chills, diaphoresis, fatigue, fever, unexpected weight change and weight loss. HENT:  Positive for rhinorrhea and sinus pressure. Negative for congestion, dental problem, drooling, ear discharge, ear pain, facial swelling, hearing loss, mouth sores, nosebleeds, postnasal drip, sinus pain, sneezing, sore throat, tinnitus, trouble swallowing and voice change. Eyes: Negative. Negative for photophobia, pain, discharge, redness, itching and visual disturbance. Respiratory:  Positive for cough and shortness of breath. Negative for apnea, hemoptysis, choking, chest tightness, wheezing and stridor. Cardiovascular: Negative. Negative for chest pain, palpitations and leg swelling. Gastrointestinal: Negative. Negative for abdominal distention, abdominal pain, anal bleeding, blood in stool, constipation, diarrhea, heartburn, nausea, rectal pain and vomiting. Musculoskeletal: Negative.   Negative for arthralgias, back pain, gait problem, joint swelling, myalgias, neck pain and neck stiffness. Skin: Negative. Negative for color change, pallor, rash and wound. Neurological: Negative. Negative for dizziness, tremors, seizures, syncope, facial asymmetry, speech difficulty, weakness, light-headedness, numbness and headaches. Physical Exam:      BP (!) 143/99   Pulse (!) 103   Temp 98 °F (36.7 °C) (Temporal)   Ht 5' 8\" (1.727 m)   Wt 250 lb (113.4 kg)   SpO2 98%   BMI 38.01 kg/m²     Physical Exam  Vitals reviewed. Constitutional:       Appearance: Normal appearance. HENT:      Head: Normocephalic and atraumatic. Right Ear: Tympanic membrane, ear canal and external ear normal.      Left Ear: Tympanic membrane, ear canal and external ear normal.      Nose:      Right Sinus: Maxillary sinus tenderness and frontal sinus tenderness present. Left Sinus: Maxillary sinus tenderness and frontal sinus tenderness present. Mouth/Throat:      Lips: Pink. Mouth: Mucous membranes are moist.      Pharynx: Uvula midline. Oropharyngeal exudate and posterior oropharyngeal erythema present. No pharyngeal swelling or uvula swelling (post nasal drainage). Tonsils: No tonsillar exudate or tonsillar abscesses. Eyes:      Extraocular Movements: Extraocular movements intact. Conjunctiva/sclera: Conjunctivae normal.      Pupils: Pupils are equal, round, and reactive to light. Cardiovascular:      Rate and Rhythm: Regular rhythm. Tachycardia present. Pulses: Normal pulses. Heart sounds: Normal heart sounds. Pulmonary:      Effort: Pulmonary effort is normal.      Breath sounds: Normal air entry. Examination of the right-upper field reveals decreased breath sounds. Examination of the left-upper field reveals decreased breath sounds. Examination of the right-lower field reveals decreased breath sounds. Examination of the left-lower field reveals decreased breath sounds.  Decreased breath sounds present. Abdominal:      General: Abdomen is flat. Bowel sounds are normal.      Palpations: Abdomen is soft. Musculoskeletal:         General: Normal range of motion. Cervical back: Normal range of motion and neck supple. Lymphadenopathy:      Cervical: Cervical adenopathy present. Skin:     General: Skin is warm and dry. Capillary Refill: Capillary refill takes less than 2 seconds. Neurological:      General: No focal deficit present. Mental Status: He is alert and oriented to person, place, and time. Mental status is at baseline. Psychiatric:         Mood and Affect: Mood normal.         Behavior: Behavior normal.       Plan:          1. Acute bacterial sinusitis  -     amoxicillin-clavulanate (AUGMENTIN) 875-125 MG per tablet; Take 1 tablet by mouth 2 times daily for 7 days, Disp-14 tablet, R-0Normal  -     predniSONE (DELTASONE) 20 MG tablet; Take 2 tablets by mouth daily for 5 days, Disp-10 tablet, R-0Normal  2. Elevated blood pressure reading  3. Acute cough  -     benzonatate (TESSALON) 200 MG capsule; Take 1 capsule by mouth 3 times daily as needed for Cough, Disp-21 capsule, R-0Normal     Advised patient to monitor blood pressure, if continues to be elevated-- follow up with PCP for further management. Continue over-the-counter medications as needed for symptoms. Use honey to the back of throat, salt water gargle as needed for sore throat, cough. Go to the ER for shortness of breath, chest pain. Patient verbalized understanding. Follow Up Instructions:      Return if symptoms worsen or fail to improve, for SOB, chest pain go to ER.     Orders Placed This Encounter   Medications    amoxicillin-clavulanate (AUGMENTIN) 875-125 MG per tablet     Sig: Take 1 tablet by mouth 2 times daily for 7 days     Dispense:  14 tablet     Refill:  0    benzonatate (TESSALON) 200 MG capsule     Sig: Take 1 capsule by mouth 3 times daily as needed for Cough     Dispense:  21 capsule Refill:  0    predniSONE (DELTASONE) 20 MG tablet     Sig: Take 2 tablets by mouth daily for 5 days     Dispense:  10 tablet     Refill:  0             Based on the duration and severity of the symptoms-- I will treat this as bacterial at this time. Patient instructed to complete antibiotic prescription fully. May use Motrin/Tylenol for fever/pain. Saline washes, salt water gargles and over the counter preparations if desired. Patient agreeable to treatment plan. Educational materials provided on AVS.  Follow up if symptoms do not improve/worsen. Patient and/or parent given educational materials - see patient instructions. Discussed use, benefit, and side effects of prescribed medications. All patient questions answered. Patient and/or parent voiced understanding.       Electronically signed by JAVIER Menon 10/18/2022 at 12:09 PM

## 2022-12-13 ENCOUNTER — OFFICE VISIT (OUTPATIENT)
Dept: PHYSICAL MEDICINE AND REHAB | Age: 37
End: 2022-12-13

## 2022-12-13 VITALS
WEIGHT: 246 LBS | SYSTOLIC BLOOD PRESSURE: 122 MMHG | BODY MASS INDEX: 37.28 KG/M2 | TEMPERATURE: 98.7 F | HEART RATE: 108 BPM | DIASTOLIC BLOOD PRESSURE: 89 MMHG | HEIGHT: 68 IN

## 2022-12-13 DIAGNOSIS — T31.20 BURN (ANY DEGREE) INVOLVING 20-29% OF BODY SURFACE: Primary | ICD-10-CM

## 2022-12-13 RX ORDER — TIZANIDINE 4 MG/1
4 TABLET ORAL PRN
COMMUNITY
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
## 2023-03-08 ENCOUNTER — OFFICE VISIT (OUTPATIENT)
Dept: FAMILY MEDICINE CLINIC | Age: 38
End: 2023-03-08

## 2023-03-08 ENCOUNTER — TELEPHONE (OUTPATIENT)
Dept: FAMILY MEDICINE CLINIC | Age: 38
End: 2023-03-08

## 2023-03-08 VITALS
DIASTOLIC BLOOD PRESSURE: 87 MMHG | SYSTOLIC BLOOD PRESSURE: 136 MMHG | OXYGEN SATURATION: 97 % | TEMPERATURE: 98.4 F | HEART RATE: 118 BPM

## 2023-03-08 DIAGNOSIS — K03.81 CRACKED TOOTH: ICD-10-CM

## 2023-03-08 DIAGNOSIS — K02.9 PAIN DUE TO DENTAL CARIES: Primary | ICD-10-CM

## 2023-03-08 PROCEDURE — 99213 OFFICE O/P EST LOW 20 MIN: CPT

## 2023-03-08 RX ORDER — CLINDAMYCIN HYDROCHLORIDE 300 MG/1
300 CAPSULE ORAL 4 TIMES DAILY
Qty: 40 CAPSULE | Refills: 0 | Status: SHIPPED | OUTPATIENT
Start: 2023-03-08 | End: 2023-03-18

## 2023-03-08 RX ORDER — CHLORHEXIDINE GLUCONATE 0.12 MG/ML
15 RINSE ORAL 2 TIMES DAILY
Qty: 210 ML | Refills: 0 | Status: SHIPPED | OUTPATIENT
Start: 2023-03-08 | End: 2023-03-15

## 2023-03-08 RX ORDER — PYRAZINAMIDE 500 MG/1
1 TABLET ORAL EVERY 6 HOURS PRN
Qty: 12 TABLET | Refills: 0 | Status: SHIPPED | OUTPATIENT
Start: 2023-03-08 | End: 2023-03-11

## 2023-03-08 ASSESSMENT — ENCOUNTER SYMPTOMS
TROUBLE SWALLOWING: 0
EYE ITCHING: 0
CONSTIPATION: 0
SHORTNESS OF BREATH: 0
VOICE CHANGE: 0
SINUS PRESSURE: 1
EYE REDNESS: 0
COLOR CHANGE: 0
DIARRHEA: 0
EYE PAIN: 0

## 2023-03-08 NOTE — TELEPHONE ENCOUNTER
Patient called wanting an antibiotic for a tooth ache. I informed him that he will need to be evaluated for treatment at a walk in clinic or urgent care. Patient voiced understanding and stated that he would go.

## 2023-03-08 NOTE — PROGRESS NOTES
[address] [address]  Dept: [phone]  Dept Fax: [phone]    Clive Self is a 45 y.o. male who presents to the urgent care today for his medical conditions/complaints as notedbelow. Clive Self is c/o of Oral Pain (Onset for a week left lower side of mouth)      HPI:     Patient could not get into the dentist in office. He is trying to get an appointment for this month  Hx of facial cellulitis    Oral Pain   This is a new problem. The current episode started in the past 7 days. The problem occurs constantly. The problem has been gradually worsening. The pain is at a severity of 10/10. The pain is severe. Associated symptoms include facial pain and sinus pressure. Pertinent negatives include no difficulty swallowing, fever, oral bleeding or thermal sensitivity. He has tried nothing for the symptoms. Past Medical History:   Diagnosis Date    History of blood transfusion     Feb 2020    Hypertension     Hx of controlled with weight loss and diet. Did not take meds. Hypertriglyceridemia 5/19/2017    Obesity (BMI 30.0-34.9) 5/19/2017    Thyroid disease     Hx of surgery on Thyroid when 16-14 years old. Current Outpatient Medications   Medication Sig Dispense Refill    clindamycin (CLEOCIN) 300 MG capsule Take 1 capsule by mouth 4 times daily for 10 days 40 capsule 0    chlorhexidine (PERIDEX) 0.12 % solution Swish and spit 15 mLs 2 times daily for 7 days 210 mL 0    acetaminophen-codeine (TYLENOL/CODEINE #3) 300-30 MG per tablet Take 1 tablet by mouth every 6 hours as needed for Pain for up to 3 days. Intended supply: 3 days.  Take lowest dose possible to manage pain Max Daily Amount: 4 tablets 12 tablet 0    tiZANidine (ZANAFLEX) 4 MG tablet Take 4 mg by mouth as needed      amitriptyline (ELAVIL) 25 MG tablet Take 1 tablet by mouth nightly 30 tablet 5 loratadine (CLARITIN) 10 MG tablet Take 1 tablet by mouth daily 30 tablet 3     No current facility-administered medications for this visit. Allergies   Allergen Reactions    Other     Seasonal        Subjective:      Review of Systems   Constitutional:  Negative for fatigue and fever. HENT:  Positive for sinus pressure. Negative for mouth sores, postnasal drip, trouble swallowing and voice change. Eyes:  Negative for pain, redness and itching. Respiratory:  Negative for shortness of breath. Gastrointestinal:  Negative for constipation and diarrhea. Skin:  Negative for color change and rash. Allergic/Immunologic: Positive for environmental allergies. Neurological:  Negative for dizziness. All other systems reviewed and are negative. 14 systems reviewed and negative except as listed in HPI. Objective:     Physical Exam  Vitals reviewed. Constitutional:       General: He is not in acute distress. Appearance: Normal appearance. He is not ill-appearing, toxic-appearing or diaphoretic. HENT:      Head: Normocephalic and atraumatic. No right periorbital erythema or left periorbital erythema. Jaw: Tenderness, swelling and pain on movement present. Right Ear: Tympanic membrane and external ear normal. No swelling or tenderness. Tympanic membrane is not perforated or erythematous. Left Ear: Tympanic membrane and external ear normal. No swelling or tenderness. Tympanic membrane is not perforated or erythematous. Nose: No nasal tenderness, congestion or rhinorrhea. Right Sinus: No maxillary sinus tenderness or frontal sinus tenderness. Left Sinus: No maxillary sinus tenderness or frontal sinus tenderness. Mouth/Throat:      Lips: Pink. Mouth: Mucous membranes are moist. Injury present. Dentition: Abnormal dentition. Dental tenderness, gingival swelling and dental caries present. No dental abscesses. Pharynx: Oropharynx is clear.  Posterior oropharyngeal erythema present. No pharyngeal swelling or oropharyngeal exudate. Eyes:      General:         Right eye: No discharge. Left eye: No discharge. Extraocular Movements: Extraocular movements intact. Conjunctiva/sclera: Conjunctivae normal.      Pupils: Pupils are equal, round, and reactive to light. Cardiovascular:      Rate and Rhythm: Regular rhythm. Tachycardia present. Heart sounds: Normal heart sounds. Pulmonary:      Effort: Pulmonary effort is normal. No respiratory distress. Breath sounds: Normal breath sounds. No stridor. No wheezing, rhonchi or rales. Chest:      Chest wall: No tenderness. Musculoskeletal:         General: No swelling or tenderness. Normal range of motion. Cervical back: Normal range of motion and neck supple. No rigidity. Right lower leg: No edema. Left lower leg: No edema. Lymphadenopathy:      Cervical: Cervical adenopathy present. Skin:     General: Skin is warm and dry. Capillary Refill: Capillary refill takes less than 2 seconds. Findings: No erythema or rash. Neurological:      Mental Status: He is alert and oriented to person, place, and time. Motor: No weakness. Coordination: Coordination normal.      Gait: Gait normal.   Psychiatric:         Mood and Affect: Mood normal.         Behavior: Behavior normal.         Thought Content: Thought content normal.         Judgment: Judgment normal.     /87 (Site: Left Upper Arm, Position: Sitting, Cuff Size: Large Adult)   Pulse (!) 118   Temp 98.4 °F (36.9 °C) (Infrared)   SpO2 97%     Assessment:       Diagnosis Orders   1. Pain due to dental caries  clindamycin (CLEOCIN) 300 MG capsule    chlorhexidine (PERIDEX) 0.12 % solution    acetaminophen-codeine (TYLENOL/CODEINE #3) 300-30 MG per tablet      2.  Cracked tooth  clindamycin (CLEOCIN) 300 MG capsule    chlorhexidine (PERIDEX) 0.12 % solution    acetaminophen-codeine (TYLENOL/CODEINE #3) 300-30 MG per tablet          Plan:   -Follow up with dentist  -Complete all of antibiotic  -Eat soft foods that are easy to chew.  -Avoid foods that might sting, such as salty or spicy foods, citrus fruits, and tomatoes.  -Do not smoke or use spit tobacco. Tobacco can slow healing in your mouth.  -Avoid straws  -Tylenol/Motrin for pain  -Ice packs for jaw    Return in about 2 weeks (around 3/22/2023) for evaluation with PCP. Orders Placed This Encounter   Medications    clindamycin (CLEOCIN) 300 MG capsule     Sig: Take 1 capsule by mouth 4 times daily for 10 days     Dispense:  40 capsule     Refill:  0    chlorhexidine (PERIDEX) 0.12 % solution     Sig: Swish and spit 15 mLs 2 times daily for 7 days     Dispense:  210 mL     Refill:  0    acetaminophen-codeine (TYLENOL/CODEINE #3) 300-30 MG per tablet     Sig: Take 1 tablet by mouth every 6 hours as needed for Pain for up to 3 days. Intended supply: 3 days. Take lowest dose possible to manage pain Max Daily Amount: 4 tablets     Dispense:  12 tablet     Refill:  0     Reduce doses taken as pain becomes manageable         Patient given educational materials - see patient instructions. Discussed use, benefit, and side effects of prescribed medications. All patient questions answered. Pt voiced understanding.     Electronically signed by JAVIER Casanova NP on 3/8/2023 at 3:55 PM

## 2023-07-18 ENCOUNTER — OFFICE VISIT (OUTPATIENT)
Dept: PHYSICAL MEDICINE AND REHAB | Age: 38
End: 2023-07-18
Payer: COMMERCIAL

## 2023-07-18 VITALS
TEMPERATURE: 98.2 F | HEART RATE: 72 BPM | WEIGHT: 241 LBS | HEIGHT: 68 IN | DIASTOLIC BLOOD PRESSURE: 82 MMHG | SYSTOLIC BLOOD PRESSURE: 130 MMHG | BODY MASS INDEX: 36.53 KG/M2

## 2023-07-18 DIAGNOSIS — T30.0 BURN: Primary | ICD-10-CM

## 2023-07-18 DIAGNOSIS — M62.838 SPASM OF MUSCLE: ICD-10-CM

## 2023-07-18 PROCEDURE — 99213 OFFICE O/P EST LOW 20 MIN: CPT | Performed by: PHYSICAL MEDICINE & REHABILITATION

## 2023-07-18 RX ORDER — TIZANIDINE 4 MG/1
4 TABLET ORAL PRN
Qty: 30 TABLET | Refills: 2 | Status: SHIPPED | OUTPATIENT
Start: 2023-07-18
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
## 2023-12-04 NOTE — TELEPHONE ENCOUNTER
FYI: Patient called complaining of cyst on cheek under right eye, puffy, red and warm to touch, oozes x 1-1/2 days. He was directed to ER, he states he will go to Urgent Care. no

## 2024-08-21 ENCOUNTER — OFFICE VISIT (OUTPATIENT)
Dept: PHYSICAL MEDICINE AND REHAB | Age: 39
End: 2024-08-21
Payer: COMMERCIAL

## 2024-08-21 VITALS
TEMPERATURE: 98.2 F | SYSTOLIC BLOOD PRESSURE: 124 MMHG | BODY MASS INDEX: 38.23 KG/M2 | WEIGHT: 251.4 LBS | HEART RATE: 95 BPM | DIASTOLIC BLOOD PRESSURE: 85 MMHG

## 2024-08-21 DIAGNOSIS — T30.0 BURN: ICD-10-CM

## 2024-08-21 DIAGNOSIS — T31.20 BURN (ANY DEGREE) INVOLVING 20-29% OF BODY SURFACE: Primary | ICD-10-CM

## 2024-08-21 DIAGNOSIS — M62.838 SPASM OF MUSCLE: ICD-10-CM

## 2024-08-21 PROCEDURE — 99214 OFFICE O/P EST MOD 30 MIN: CPT | Performed by: PHYSICAL MEDICINE & REHABILITATION

## 2024-08-21 RX ORDER — ROPINIROLE 0.5 MG/1
0.5 TABLET, FILM COATED ORAL
Qty: 30 TABLET | Refills: 2 | Status: SHIPPED | OUTPATIENT
Start: 2024-08-21

## 2024-08-21 RX ORDER — TIZANIDINE 4 MG/1
4 TABLET ORAL PRN
Qty: 30 TABLET | Refills: 5 | Status: SHIPPED | OUTPATIENT
Start: 2024-08-21
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
## 2024-09-12 ENCOUNTER — OFFICE VISIT (OUTPATIENT)
Dept: FAMILY MEDICINE CLINIC | Age: 39
End: 2024-09-12
Payer: COMMERCIAL

## 2024-09-12 VITALS
OXYGEN SATURATION: 96 % | DIASTOLIC BLOOD PRESSURE: 84 MMHG | RESPIRATION RATE: 16 BRPM | WEIGHT: 242.2 LBS | TEMPERATURE: 98.9 F | HEIGHT: 71 IN | HEART RATE: 100 BPM | BODY MASS INDEX: 33.91 KG/M2 | SYSTOLIC BLOOD PRESSURE: 136 MMHG

## 2024-09-12 DIAGNOSIS — R03.0 ELEVATED BP WITHOUT DIAGNOSIS OF HYPERTENSION: Primary | ICD-10-CM

## 2024-09-12 DIAGNOSIS — E66.09 CLASS 1 OBESITY DUE TO EXCESS CALORIES WITHOUT SERIOUS COMORBIDITY WITH BODY MASS INDEX (BMI) OF 34.0 TO 34.9 IN ADULT: ICD-10-CM

## 2024-09-12 PROBLEM — M25.562 PAIN IN LEFT KNEE: Status: ACTIVE | Noted: 2020-06-08

## 2024-09-12 PROBLEM — S51.819A LACERATION OF FOREARM: Status: ACTIVE | Noted: 2020-06-03

## 2024-09-12 PROCEDURE — G8417 CALC BMI ABV UP PARAM F/U: HCPCS | Performed by: NURSE PRACTITIONER

## 2024-09-12 PROCEDURE — 99204 OFFICE O/P NEW MOD 45 MIN: CPT | Performed by: NURSE PRACTITIONER

## 2024-09-12 PROCEDURE — 4004F PT TOBACCO SCREEN RCVD TLK: CPT | Performed by: NURSE PRACTITIONER

## 2024-09-12 PROCEDURE — G8427 DOCREV CUR MEDS BY ELIG CLIN: HCPCS | Performed by: NURSE PRACTITIONER

## 2024-09-12 SDOH — ECONOMIC STABILITY: FOOD INSECURITY: WITHIN THE PAST 12 MONTHS, THE FOOD YOU BOUGHT JUST DIDN'T LAST AND YOU DIDN'T HAVE MONEY TO GET MORE.: NEVER TRUE

## 2024-09-12 SDOH — ECONOMIC STABILITY: INCOME INSECURITY: HOW HARD IS IT FOR YOU TO PAY FOR THE VERY BASICS LIKE FOOD, HOUSING, MEDICAL CARE, AND HEATING?: NOT VERY HARD

## 2024-09-12 SDOH — ECONOMIC STABILITY: FOOD INSECURITY: WITHIN THE PAST 12 MONTHS, YOU WORRIED THAT YOUR FOOD WOULD RUN OUT BEFORE YOU GOT MONEY TO BUY MORE.: NEVER TRUE

## 2024-09-12 SDOH — ECONOMIC STABILITY: TRANSPORTATION INSECURITY
IN THE PAST 12 MONTHS, HAS LACK OF TRANSPORTATION KEPT YOU FROM MEETINGS, WORK, OR FROM GETTING THINGS NEEDED FOR DAILY LIVING?: NO

## 2024-09-12 ASSESSMENT — PATIENT HEALTH QUESTIONNAIRE - PHQ9
SUM OF ALL RESPONSES TO PHQ QUESTIONS 1-9: 0
1. LITTLE INTEREST OR PLEASURE IN DOING THINGS: NOT AT ALL
SUM OF ALL RESPONSES TO PHQ QUESTIONS 1-9: 0
2. FEELING DOWN, DEPRESSED OR HOPELESS: NOT AT ALL
1. LITTLE INTEREST OR PLEASURE IN DOING THINGS: NOT AT ALL
2. FEELING DOWN, DEPRESSED OR HOPELESS: NOT AT ALL
SUM OF ALL RESPONSES TO PHQ9 QUESTIONS 1 & 2: 0
SUM OF ALL RESPONSES TO PHQ QUESTIONS 1-9: 0
SUM OF ALL RESPONSES TO PHQ QUESTIONS 1-9: 0
SUM OF ALL RESPONSES TO PHQ9 QUESTIONS 1 & 2: 0

## 2024-09-12 ASSESSMENT — ENCOUNTER SYMPTOMS
SHORTNESS OF BREATH: 0
NAUSEA: 0
COUGH: 0
ABDOMINAL PAIN: 0

## 2024-09-19 ENCOUNTER — HOSPITAL ENCOUNTER (OUTPATIENT)
Age: 39
Discharge: HOME | End: 2024-09-19
Payer: COMMERCIAL

## 2024-09-19 DIAGNOSIS — T31.20 BURN (ANY DEGREE) INVOLVING 20-29% OF BODY SURFACE: ICD-10-CM

## 2024-09-19 DIAGNOSIS — E66.09 CLASS 1 OBESITY DUE TO EXCESS CALORIES WITHOUT SERIOUS COMORBIDITY WITH BODY MASS INDEX (BMI) OF 34.0 TO 34.9 IN ADULT: ICD-10-CM

## 2024-09-19 LAB
ALBUMIN SERPL-MCNC: 4.6 G/DL (ref 3.5–5.2)
ALP SERPL-CCNC: 44 U/L (ref 40–129)
ALT SERPL-CCNC: 60 U/L (ref 5–41)
ANION GAP SERPL CALCULATED.3IONS-SCNC: 9 MMOL/L (ref 9–17)
AST SERPL-CCNC: 32 U/L
BILIRUB SERPL-MCNC: 0.6 MG/DL (ref 0.3–1.2)
BUN SERPL-MCNC: 14 MG/DL (ref 6–20)
CALCIUM SERPL-MCNC: 9.5 MG/DL (ref 8.6–10.4)
CHLORIDE SERPL-SCNC: 98 MMOL/L (ref 98–107)
CHOLEST SERPL-MCNC: 180 MG/DL
CHOLESTEROL/HDL RATIO: 4.7
CO2 SERPL-SCNC: 28 MMOL/L (ref 20–31)
CREAT SERPL-MCNC: 1.2 MG/DL (ref 0.7–1.2)
EST. AVERAGE GLUCOSE BLD GHB EST-MCNC: 105 MG/DL
GFR, ESTIMATED: 79 ML/MIN/1.73M2
GLUCOSE SERPL-MCNC: 114 MG/DL (ref 70–99)
HBA1C MFR BLD: 5.3 % (ref 4–6)
HDLC SERPL-MCNC: 38 MG/DL
LDLC SERPL CALC-MCNC: 93 MG/DL (ref 0–130)
POTASSIUM SERPL-SCNC: 4.4 MMOL/L (ref 3.7–5.3)
PROT SERPL-MCNC: 7.7 G/DL (ref 6.4–8.3)
SODIUM SERPL-SCNC: 135 MMOL/L (ref 135–144)
TRIGL SERPL-MCNC: 247 MG/DL

## 2024-09-19 PROCEDURE — 80053 COMPREHEN METABOLIC PANEL: CPT

## 2024-09-19 PROCEDURE — 36415 COLL VENOUS BLD VENIPUNCTURE: CPT

## 2024-09-19 PROCEDURE — 80061 LIPID PANEL: CPT

## 2024-09-19 PROCEDURE — 83036 HEMOGLOBIN GLYCOSYLATED A1C: CPT

## (undated) DEVICE — SUTURE ETHLN SZ 3-0 L18IN NONABSORBABLE BLK FS-1 L24MM 3/8 663H

## (undated) DEVICE — PACK ARTHRO W PCH

## (undated) DEVICE — [TOMCAT CUTTER, ARTHROSCOPIC SHAVER BLADE,  DO NOT RESTERILIZE,  DO NOT USE IF PACKAGE IS DAMAGED,  KEEP DRY,  KEEP AWAY FROM SUNLIGHT]: Brand: FORMULA

## (undated) DEVICE — SOLUTION IV IRRIG LACTATED RINGERS 3000ML 2B7487

## (undated) DEVICE — GOWN,AURORA,NONREINFORCED,LARGE: Brand: MEDLINE

## (undated) DEVICE — ZIMMER® STERILE DISPOSABLE TOURNIQUET CUFF WITH PLC, DUAL PORT, SINGLE BLADDER, 30 IN. (76 CM)

## (undated) DEVICE — PADDING CAST W6INXL4YD POLY POR SPUN DACRON SYN VERSATILE

## (undated) DEVICE — SINGLE PORT MANIFOLD: Brand: NEPTUNE 2

## (undated) DEVICE — DRESSING,GAUZE,XEROFORM,CURAD,1"X8",ST: Brand: CURAD

## (undated) DEVICE — MERCY HEALTH ST CHARLES: Brand: MEDLINE INDUSTRIES, INC.

## (undated) DEVICE — COUNTER NDL 10 COUNT HLD 20 FOAM BLK SGL MAG

## (undated) DEVICE — GLOVE ORTHO 8   MSG9480